# Patient Record
Sex: MALE | Race: WHITE | Employment: FULL TIME | URBAN - METROPOLITAN AREA
[De-identification: names, ages, dates, MRNs, and addresses within clinical notes are randomized per-mention and may not be internally consistent; named-entity substitution may affect disease eponyms.]

---

## 2023-03-19 ENCOUNTER — HOSPITAL ENCOUNTER (OUTPATIENT)
Age: 43
Discharge: HOME | End: 2023-03-19
Payer: COMMERCIAL

## 2023-03-19 ENCOUNTER — APPOINTMENT (OUTPATIENT)
Dept: GENERAL RADIOLOGY | Age: 43
End: 2023-03-19
Attending: NURSE PRACTITIONER
Payer: COMMERCIAL

## 2023-03-19 VITALS
OXYGEN SATURATION: 99 % | DIASTOLIC BLOOD PRESSURE: 82 MMHG | TEMPERATURE: 98 F | BODY MASS INDEX: 28.04 KG/M2 | SYSTOLIC BLOOD PRESSURE: 124 MMHG | WEIGHT: 185 LBS | HEIGHT: 68 IN | RESPIRATION RATE: 18 BRPM | HEART RATE: 81 BPM

## 2023-03-19 DIAGNOSIS — J02.0 STREP PHARYNGITIS: Primary | ICD-10-CM

## 2023-03-19 LAB — S PYO AG THROAT QL: POSITIVE

## 2023-03-19 RX ORDER — AMOXICILLIN AND CLAVULANATE POTASSIUM 875; 125 MG/1; MG/1
1 TABLET, FILM COATED ORAL 2 TIMES DAILY
Qty: 14 TABLET | Refills: 0 | Status: SHIPPED | OUTPATIENT
Start: 2023-03-19 | End: 2023-03-26

## 2023-03-19 RX ORDER — IBUPROFEN 400 MG/1
400 TABLET ORAL EVERY 6 HOURS PRN
COMMUNITY

## 2023-03-19 NOTE — DISCHARGE INSTRUCTIONS
Rest and drink plenty of fluids. Take Tylenol and/or ibuprofen as needed for pain or fever. Start the antibiotics and make sure to finish the entire prescription. Do not share glasses, cups, or utensils. Make sure to change your toothbrush after 48 hours of antibiotic use. Follow up with your PCP in 1 week. Thank you for choosing [facility] for your care.

## 2023-03-19 NOTE — ED INITIAL ASSESSMENT (HPI)
Patient's family has been sick recently. He started 8 days ago with symptoms of sore throat, chills mid week without confirmed temp, cough that is now productive. He has MS and a history of pneumonia. He is concerned about having pneumonia.

## 2023-04-28 ENCOUNTER — HOSPITAL ENCOUNTER (OUTPATIENT)
Age: 43
Discharge: HOME | End: 2023-04-28
Payer: COMMERCIAL

## 2023-04-28 VITALS
SYSTOLIC BLOOD PRESSURE: 114 MMHG | RESPIRATION RATE: 20 BRPM | HEART RATE: 76 BPM | OXYGEN SATURATION: 97 % | DIASTOLIC BLOOD PRESSURE: 72 MMHG | TEMPERATURE: 97 F

## 2023-04-28 DIAGNOSIS — B34.9 VIRAL SYNDROME: ICD-10-CM

## 2023-04-28 DIAGNOSIS — R05.9 COUGH: Primary | ICD-10-CM

## 2023-04-28 LAB
S PYO AG THROAT QL: NEGATIVE
SARS-COV-2 RNA RESP QL NAA+PROBE: NOT DETECTED

## 2023-04-28 PROCEDURE — 99213 OFFICE O/P EST LOW 20 MIN: CPT | Performed by: NURSE PRACTITIONER

## 2023-04-28 PROCEDURE — U0002 COVID-19 LAB TEST NON-CDC: HCPCS | Performed by: NURSE PRACTITIONER

## 2023-04-28 PROCEDURE — 87880 STREP A ASSAY W/OPTIC: CPT | Performed by: NURSE PRACTITIONER

## 2023-04-28 RX ORDER — FINASTERIDE 1 MG/1
1 TABLET, FILM COATED ORAL DAILY
COMMUNITY
Start: 2022-09-16

## 2023-04-28 RX ORDER — GLATIRAMER 40 MG/ML
40 INJECTION, SOLUTION SUBCUTANEOUS
COMMUNITY
Start: 2023-04-19 | End: 2023-05-19

## 2023-04-28 NOTE — DISCHARGE INSTRUCTIONS
Follow-up with your primary care physician in one week if symptoms have not improved or symptoms are starting to get worse. Increase fluids, keep well-hydrated. Take Tylenol and Motrin for fever and pain. Eat and drink anything that is soothing to the back of the throat. Gargle with warm salt water, throat lozenges will be helpful.     Over-the-counter antihistamine such as Zyrtec, Claritin, Allegra, Xyzal can be helpful choose 1 take it nightly  Over-the-counter Flonase also helpful

## 2023-05-08 ENCOUNTER — HOSPITAL ENCOUNTER (OUTPATIENT)
Age: 43
Discharge: HOME | End: 2023-05-08
Payer: COMMERCIAL

## 2023-05-08 VITALS
RESPIRATION RATE: 18 BRPM | WEIGHT: 185 LBS | HEIGHT: 68 IN | OXYGEN SATURATION: 98 % | SYSTOLIC BLOOD PRESSURE: 127 MMHG | BODY MASS INDEX: 28.04 KG/M2 | DIASTOLIC BLOOD PRESSURE: 78 MMHG | HEART RATE: 78 BPM | TEMPERATURE: 97 F

## 2023-05-08 DIAGNOSIS — J01.00 ACUTE NON-RECURRENT MAXILLARY SINUSITIS: Primary | ICD-10-CM

## 2023-05-08 PROCEDURE — 99213 OFFICE O/P EST LOW 20 MIN: CPT | Performed by: NURSE PRACTITIONER

## 2023-05-08 RX ORDER — AMOXICILLIN AND CLAVULANATE POTASSIUM 875; 125 MG/1; MG/1
1 TABLET, FILM COATED ORAL 2 TIMES DAILY
Qty: 20 TABLET | Refills: 0 | Status: SHIPPED | OUTPATIENT
Start: 2023-05-08 | End: 2023-05-18

## 2023-05-08 NOTE — ED INITIAL ASSESSMENT (HPI)
Pt was seen 4/28-URI. Pt c/o \"coughing up green mucus along with blowing out my nose. I feel pressure on my face with body aches, occasional headaches with fatigue. \"

## 2023-07-18 ENCOUNTER — HOSPITAL ENCOUNTER (OUTPATIENT)
Age: 43
Discharge: HOME | End: 2023-07-18
Payer: COMMERCIAL

## 2023-07-18 VITALS
DIASTOLIC BLOOD PRESSURE: 69 MMHG | OXYGEN SATURATION: 98 % | SYSTOLIC BLOOD PRESSURE: 118 MMHG | HEART RATE: 69 BPM | RESPIRATION RATE: 18 BRPM | TEMPERATURE: 98 F

## 2023-07-18 DIAGNOSIS — K40.90 NON-RECURRENT UNILATERAL INGUINAL HERNIA WITHOUT OBSTRUCTION OR GANGRENE: Primary | ICD-10-CM

## 2023-07-18 PROBLEM — Q21.12 PFO (PATENT FORAMEN OVALE): Status: ACTIVE | Noted: 2023-07-18

## 2023-07-18 PROBLEM — I63.9 CEREBROVASCULAR ACCIDENT (CVA) (CMD): Status: ACTIVE | Noted: 2023-07-18

## 2023-07-18 PROCEDURE — 99203 OFFICE O/P NEW LOW 30 MIN: CPT | Performed by: PHYSICIAN ASSISTANT

## 2023-07-18 NOTE — DISCHARGE INSTRUCTIONS
For any signs of acute incarceration including discoloration, severe pain, firmness report to the ER. This is a surgical emergency.   Otherwise, outpatient follow-up

## 2023-07-20 ENCOUNTER — OFFICE VISIT (OUTPATIENT)
Dept: PEDIATRIC CARDIOLOGY | Age: 43
End: 2023-07-20

## 2023-07-20 ENCOUNTER — APPOINTMENT (OUTPATIENT)
Dept: PEDIATRIC CARDIOLOGY | Age: 43
End: 2023-07-20

## 2023-07-20 VITALS
SYSTOLIC BLOOD PRESSURE: 122 MMHG | HEART RATE: 70 BPM | DIASTOLIC BLOOD PRESSURE: 75 MMHG | OXYGEN SATURATION: 98 % | WEIGHT: 178.24 LBS | HEIGHT: 67 IN | BODY MASS INDEX: 27.98 KG/M2

## 2023-07-20 DIAGNOSIS — I63.9 CEREBROVASCULAR ACCIDENT (CVA), UNSPECIFIED MECHANISM (CMD): ICD-10-CM

## 2023-07-20 DIAGNOSIS — Q21.12 PFO (PATENT FORAMEN OVALE): Primary | ICD-10-CM

## 2023-07-20 PROCEDURE — 99205 OFFICE O/P NEW HI 60 MIN: CPT | Performed by: PEDIATRICS

## 2023-07-20 RX ORDER — LORATADINE 10 MG/1
10 CAPSULE, LIQUID FILLED ORAL DAILY
COMMUNITY
Start: 2023-05-15

## 2023-07-20 RX ORDER — OMEGA-3 FATTY ACIDS/FISH OIL 300-1000MG
400 CAPSULE ORAL DAILY
COMMUNITY
Start: 2023-04-26

## 2023-07-20 RX ORDER — GLATIRAMER 40 MG/ML
40 INJECTION, SOLUTION SUBCUTANEOUS
COMMUNITY
Start: 2023-06-13

## 2023-07-20 RX ORDER — ASPIRIN 325 MG
325 TABLET, DELAYED RELEASE (ENTERIC COATED) ORAL
COMMUNITY

## 2023-07-20 RX ORDER — FINASTERIDE 1 MG/1
1 TABLET, FILM COATED ORAL DAILY
COMMUNITY
Start: 2023-05-30

## 2023-07-20 ASSESSMENT — PAIN SCALES - GENERAL: PAINLEVEL: 0

## 2023-08-28 ENCOUNTER — OFFICE VISIT (OUTPATIENT)
Facility: LOCATION | Age: 43
End: 2023-08-28
Payer: COMMERCIAL

## 2023-08-28 DIAGNOSIS — K40.90 RIGHT INGUINAL HERNIA: Primary | ICD-10-CM

## 2023-08-28 DIAGNOSIS — Q21.12 PATENT FORAMEN OVALE: ICD-10-CM

## 2023-08-28 NOTE — H&P (VIEW-ONLY)
New Patient Visit Note       Active Problems      1. Right inguinal hernia    2. Patent foramen ovale        Chief Complaint   Patient presents with:  New Patient: NP - INGUINAL HERNIA REPAIR CONSULT, HERNIA POPPED 7/16, IMMEDIATE CARE 7/18, NO OTHER SYMPTOMS. History of Present Illness   The patient is a 45-year-old gentleman who presents for evaluation of the right inguinal hernia. He is seen at the request of her primary care physician. The patient states on July 18th he was seen at an urgent care after noticing a bulge in the right groin. He states he first noticed the bulge on July 16th while at [facility]. He states it initially felt hard and wouldn't reduce, but the following day it reduced spontaneously. The hernia increases in size with standing and Valsalva. He occasionally has discomfort in the area after lifting or with activity. The patient does have some constipation, but is unsure whether it is related. He denies difficulty urinating. The patient states he has a PFO and his cardiologist wants the hernia fixed prior to the repair of the PFO. He previously saw Dr. Malathi Handy at [facility]. He is now seeing someone at [employer], Dr. Myra Uriostegui. He takes 325 mg of aspirin daily. He also has a history of an ischemic vertebrobasilar artery thalamic stroke in 2014. The patient has no significant past surgical history. I acted as a scribe in this encounter. The physician obtained a history, independently performed a physical exam, and developed an assessment and plan. The physician performed all medical decision making. Indira Kent PA-C        Allergies  Daphne Castillo has No Known Allergies. Past Medical / Surgical / Social / Family History    The past medical and past surgical history have been reviewed by me today.     Past Medical History:   Diagnosis Date    MS (multiple sclerosis) ([facility] [address]     PFO (patent foramen ovale)     Stroke [facility])      Past Surgical History:   Procedure Laterality Date    OTHER SURGICAL HISTORY  2001    nasal passage surgery    URINE FLOW MEASUREMENT  12/09    normal       The family history and social history have been reviewed by me today. Family History   Problem Relation Age of Onset    Diabetes Maternal Grandfather     Neurological Disorder Maternal Grandmother         dementia    Neurological Disorder Brother         tic     Social History    Socioeconomic History      Marital status:     Occupational History      Occupation:     Tobacco Use      Smoking status: Never      Smokeless tobacco: Never    Substance and Sexual Activity      Alcohol use: Yes        Alcohol/week: 0.0 standard drinks of alcohol        Comment: 3-5 beer/liquor      Drug use: No    Other Topics      Concerns:         Service: No        Blood Transfusions: No        Caffeine Concern: Yes          3 cups of coffee daily; energy drinks 2 cans week        Occupational Exposure: No        Hobby Hazards: No        Sleep Concern: No        Stress Concern: No        Weight Concern: No        Special Diet: No        Back Care: Yes        Exercise: Yes          walks dog 20 minute day        Bike Helmet: Yes        Seat Belt: Yes        Self-Exams: Yes       Current Outpatient Medications:     finasteride 1 MG Oral Tab, Take 1 tablet (1 mg total) by mouth daily. (Patient not taking: Reported on 7/18/2023), Disp: , Rfl:     ibuprofen 400 MG Oral Tab, Take 1 tablet (400 mg total) by mouth every 6 (six) hours as needed for Pain. (Patient not taking: Reported on 7/18/2023), Disp: , Rfl:     Multiple Vitamin Oral Tab, Take 1 tablet by mouth daily. occasional, Disp: , Rfl:     aspirin  MG Oral Tab EC, Take 1 tablet (325 mg total) by mouth daily. (Patient not taking: Reported on 7/18/2023), Disp: , Rfl:       Review of Systems  The Review of Systems has been reviewed by me during today.   Review of Systems   Constitutional: Negative. HENT: Negative. Eyes: Negative. Respiratory: Negative. Cardiovascular: Negative. Gastrointestinal: Negative. Genitourinary: Negative. Musculoskeletal: Negative. Skin: Negative. Neurological: Negative. Psychiatric/Behavioral: Negative. Physical Exam  Constitutional:       Appearance: Normal appearance. He is normal weight. HENT:      Head: Normocephalic and atraumatic. Right Ear: External ear normal.      Left Ear: External ear normal.      Nose: Nose normal.   Eyes:      Conjunctiva/sclera: Conjunctivae normal.   Abdominal:      General: Abdomen is flat. Bowel sounds are normal. There is no distension. Palpations: Abdomen is soft. There is no mass. Tenderness: There is no abdominal tenderness. Hernia: A hernia is present. Hernia is present in the right inguinal area. There is no hernia in the umbilical area, ventral area, left inguinal area, right femoral area or left femoral area. Comments: Clinical exam of the abdomen is soft, nontender, nondistended. No rebound or guarding. Liver and spleen are nonpalpable. There is no umbilical hernia. + Right inguinal hernia    Neurological:      Mental Status: He is alert. Psychiatric:         Mood and Affect: Mood normal.         Behavior: Behavior normal.             Assessment   Right inguinal hernia  (primary encounter diagnosis)  Patent foramen ovale      Plan   The patient was offered laparoscopic right inguinal hernia repair with mesh. The risks, benefits, and alternatives of this were discussed in detail with the patient. Risks included, but were not limited to, recurrence of the hernia, bleeding, wound infection, and injury to the hernia contents. The patient was agreeable to proceed with the operation. He will need cardiac clearance prior to surgery. His surgery has been scheduled for September 18, 2023.      Astrid Mari MD    Addendum:  I, Dr. Maryanne Mohs, personally performed the services described in this documentation, as scribed by Carlos Galvez PA-C, in my presence, and it is both accurate and complete.

## 2023-08-28 NOTE — H&P
New Patient Visit Note       Active Problems      1. Right inguinal hernia    2. Patent foramen ovale        Chief Complaint   Patient presents with:  New Patient: NP - INGUINAL HERNIA REPAIR CONSULT, HERNIA POPPED 7/16, IMMEDIATE CARE 7/18, NO OTHER SYMPTOMS. History of Present Illness   The patient is a 49-year-old gentleman who presents for evaluation of the right inguinal hernia. He is seen at the request of her primary care physician. The patient states on July 18th he was seen at an urgent care after noticing a bulge in the right groin. He states he first noticed the bulge on July 16th while at [facility]. He states it initially felt hard and wouldn't reduce, but the following day it reduced spontaneously. The hernia increases in size with standing and Valsalva. He occasionally has discomfort in the area after lifting or with activity. The patient does have some constipation, but is unsure whether it is related. He denies difficulty urinating. The patient states he has a PFO and his cardiologist wants the hernia fixed prior to the repair of the PFO. He previously saw Dr. Cecilia Eric at [facility]. He is [facility], Dr. Verónica Frye. He takes 325 mg of aspirin daily. He also has a history of an ischemic vertebrobasilar artery thalamic stroke in 2014. The patient has no significant past surgical history. I acted as a scribe in this encounter. The physician obtained a history, independently performed a physical exam, and developed an assessment and plan. The physician performed all medical decision making. ELISABET Norwood has No Known Allergies. Past Medical / Surgical / Social / Family History    The past medical and past surgical history have been reviewed by me today.     Past Medical History:   Diagnosis Date    MS (multiple sclerosis) ([facility] [address]     PFO (patent foramen ovale)     Stroke [facility])      Past Surgical History:   Procedure Laterality Date    OTHER SURGICAL HISTORY  2001    nasal passage surgery    URINE FLOW MEASUREMENT  12/09    normal       The family history and social history have been reviewed by me today. Family History   Problem Relation Age of Onset    Diabetes Maternal Grandfather     Neurological Disorder Maternal Grandmother         dementia    Neurological Disorder Brother         tic     Social History    Socioeconomic History      Marital status:     Occupational History      Occupation:     Tobacco Use      Smoking status: Never      Smokeless tobacco: Never    Substance and Sexual Activity      Alcohol use: Yes        Alcohol/week: 0.0 standard drinks of alcohol        Comment: 3-5 beer/liquor      Drug use: No    Other Topics      Concerns:         Service: No        Blood Transfusions: No        Caffeine Concern: Yes          3 cups of coffee daily; energy drinks 2 cans week        Occupational Exposure: No        Hobby Hazards: No        Sleep Concern: No        Stress Concern: No        Weight Concern: No        Special Diet: No        Back Care: Yes        Exercise: Yes          walks dog 20 minute day        Bike Helmet: Yes        Seat Belt: Yes        Self-Exams: Yes       Current Outpatient Medications:     finasteride 1 MG Oral Tab, Take 1 tablet (1 mg total) by mouth daily. (Patient not taking: Reported on 7/18/2023), Disp: , Rfl:     ibuprofen 400 MG Oral Tab, Take 1 tablet (400 mg total) by mouth every 6 (six) hours as needed for Pain. (Patient not taking: Reported on 7/18/2023), Disp: , Rfl:     Multiple Vitamin Oral Tab, Take 1 tablet by mouth daily. occasional, Disp: , Rfl:     aspirin  MG Oral Tab EC, Take 1 tablet (325 mg total) by mouth daily. (Patient not taking: Reported on 7/18/2023), Disp: , Rfl:       Review of Systems  The Review of Systems has been reviewed by me during today.   Review of Systems   Constitutional: Negative. HENT: Negative. Eyes: Negative. Respiratory: Negative. Cardiovascular: Negative. Gastrointestinal: Negative. Genitourinary: Negative. Musculoskeletal: Negative. Skin: Negative. Neurological: Negative. Psychiatric/Behavioral: Negative. Physical Exam  Constitutional:       Appearance: Normal appearance. He is normal weight. HENT:      Head: Normocephalic and atraumatic. Right Ear: External ear normal.      Left Ear: External ear normal.      Nose: Nose normal.   Eyes:      Conjunctiva/sclera: Conjunctivae normal.   Abdominal:      General: Abdomen is flat. Bowel sounds are normal. There is no distension. Palpations: Abdomen is soft. There is no mass. Tenderness: There is no abdominal tenderness. Hernia: A hernia is present. Hernia is present in the right inguinal area. There is no hernia in the umbilical area, ventral area, left inguinal area, right femoral area or left femoral area. Comments: Clinical exam of the abdomen is soft, nontender, nondistended. No rebound or guarding. Liver and spleen are nonpalpable. There is no umbilical hernia. + Right inguinal hernia    Neurological:      Mental Status: He is alert. Psychiatric:         Mood and Affect: Mood normal.         Behavior: Behavior normal.             Assessment   Right inguinal hernia  (primary encounter diagnosis)  Patent foramen ovale      Plan   The patient was offered laparoscopic right inguinal hernia repair with mesh. The risks, benefits, and alternatives of this were discussed in detail with the patient. Risks included, but were not limited to, recurrence of the hernia, bleeding, wound infection, and injury to the hernia contents. The patient was agreeable to proceed with the operation. He will need cardiac clearance prior to surgery. His surgery has been scheduled for September 18, 2023.      Tone Garcia MD    Addendum:  I, Dr. Jayesh Santo, personally performed the services described in this documentation, as scribed by Carlos Galvez PA-C, in my presence, and it is both accurate and complete.

## 2023-08-29 ENCOUNTER — TELEPHONE (OUTPATIENT)
Facility: LOCATION | Age: 43
End: 2023-08-29

## 2023-08-29 DIAGNOSIS — K40.90 RIGHT INGUINAL HERNIA: Primary | ICD-10-CM

## 2023-08-31 ENCOUNTER — TELEPHONE (OUTPATIENT)
Facility: LOCATION | Age: 43
End: 2023-08-31

## 2023-08-31 ENCOUNTER — TELEPHONE (OUTPATIENT)
Dept: PEDIATRIC CARDIOLOGY | Age: 43
End: 2023-08-31

## 2023-08-31 RX ORDER — GLATIRAMER 40 MG/ML
40 INJECTION, SOLUTION SUBCUTANEOUS WEEKLY
COMMUNITY

## 2023-08-31 RX ORDER — LORATADINE 10 MG/1
10 TABLET, ORALLY DISINTEGRATING ORAL DAILY
COMMUNITY

## 2023-09-13 ENCOUNTER — TELEPHONE (OUTPATIENT)
Facility: LOCATION | Age: 43
End: 2023-09-13

## 2023-09-17 ENCOUNTER — ANESTHESIA EVENT (OUTPATIENT)
Dept: SURGERY | Facility: HOSPITAL | Age: 43
End: 2023-09-17
Payer: COMMERCIAL

## 2023-09-18 ENCOUNTER — HOSPITAL ENCOUNTER (OUTPATIENT)
Facility: HOSPITAL | Age: 43
Setting detail: HOSPITAL OUTPATIENT SURGERY
Discharge: HOME | End: 2023-09-18
Attending: SURGERY | Admitting: SURGERY
Payer: COMMERCIAL

## 2023-09-18 ENCOUNTER — ANESTHESIA (OUTPATIENT)
Dept: SURGERY | Facility: HOSPITAL | Age: 43
End: 2023-09-18
Payer: COMMERCIAL

## 2023-09-18 VITALS
SYSTOLIC BLOOD PRESSURE: 109 MMHG | DIASTOLIC BLOOD PRESSURE: 80 MMHG | BODY MASS INDEX: 28.25 KG/M2 | HEART RATE: 77 BPM | RESPIRATION RATE: 18 BRPM | OXYGEN SATURATION: 96 % | WEIGHT: 180 LBS | HEIGHT: 67 IN | TEMPERATURE: 98 F

## 2023-09-18 DIAGNOSIS — I63.112 CEREBROVASCULAR ACCIDENT (CVA) DUE TO EMBOLISM OF LEFT VERTEBRAL ARTERY (HCC): ICD-10-CM

## 2023-09-18 DIAGNOSIS — Q21.12 PATENT FORAMEN OVALE: ICD-10-CM

## 2023-09-18 PROCEDURE — 0YUA4JZ SUPPLEMENT BILATERAL INGUINAL REGION WITH SYNTHETIC SUBSTITUTE, PERCUTANEOUS ENDOSCOPIC APPROACH: ICD-10-PCS | Performed by: SURGERY

## 2023-09-18 DEVICE — BARD MESH
Type: IMPLANTABLE DEVICE | Site: INGUINAL | Status: FUNCTIONAL
Brand: BARD MESH

## 2023-09-18 RX ORDER — KETOROLAC TROMETHAMINE 30 MG/ML
INJECTION, SOLUTION INTRAMUSCULAR; INTRAVENOUS AS NEEDED
Status: DISCONTINUED | OUTPATIENT
Start: 2023-09-18 | End: 2023-09-18 | Stop reason: SURG

## 2023-09-18 RX ORDER — SODIUM CHLORIDE, SODIUM LACTATE, POTASSIUM CHLORIDE, CALCIUM CHLORIDE 600; 310; 30; 20 MG/100ML; MG/100ML; MG/100ML; MG/100ML
INJECTION, SOLUTION INTRAVENOUS CONTINUOUS
Status: DISCONTINUED | OUTPATIENT
Start: 2023-09-18 | End: 2023-09-18

## 2023-09-18 RX ORDER — ONDANSETRON 2 MG/ML
INJECTION INTRAMUSCULAR; INTRAVENOUS AS NEEDED
Status: DISCONTINUED | OUTPATIENT
Start: 2023-09-18 | End: 2023-09-18 | Stop reason: SURG

## 2023-09-18 RX ORDER — ACETAMINOPHEN 500 MG
1000 TABLET ORAL ONCE AS NEEDED
Status: COMPLETED | OUTPATIENT
Start: 2023-09-18 | End: 2023-09-18

## 2023-09-18 RX ORDER — HYDROCODONE BITARTRATE AND ACETAMINOPHEN 10; 325 MG/1; MG/1
2 TABLET ORAL ONCE AS NEEDED
Status: COMPLETED | OUTPATIENT
Start: 2023-09-18 | End: 2023-09-18

## 2023-09-18 RX ORDER — BUPIVACAINE HYDROCHLORIDE AND EPINEPHRINE 5; 5 MG/ML; UG/ML
INJECTION, SOLUTION EPIDURAL; INTRACAUDAL; PERINEURAL AS NEEDED
Status: DISCONTINUED | OUTPATIENT
Start: 2023-09-18 | End: 2023-09-18 | Stop reason: HOSPADM

## 2023-09-18 RX ORDER — DEXAMETHASONE SODIUM PHOSPHATE 4 MG/ML
VIAL (ML) INJECTION AS NEEDED
Status: DISCONTINUED | OUTPATIENT
Start: 2023-09-18 | End: 2023-09-18 | Stop reason: SURG

## 2023-09-18 RX ORDER — LIDOCAINE HYDROCHLORIDE 10 MG/ML
INJECTION, SOLUTION EPIDURAL; INFILTRATION; INTRACAUDAL; PERINEURAL AS NEEDED
Status: DISCONTINUED | OUTPATIENT
Start: 2023-09-18 | End: 2023-09-18 | Stop reason: SURG

## 2023-09-18 RX ORDER — NALOXONE HYDROCHLORIDE 0.4 MG/ML
80 INJECTION, SOLUTION INTRAMUSCULAR; INTRAVENOUS; SUBCUTANEOUS AS NEEDED
Status: DISCONTINUED | OUTPATIENT
Start: 2023-09-18 | End: 2023-09-18

## 2023-09-18 RX ORDER — ONDANSETRON 2 MG/ML
4 INJECTION INTRAMUSCULAR; INTRAVENOUS EVERY 6 HOURS PRN
Status: DISCONTINUED | OUTPATIENT
Start: 2023-09-18 | End: 2023-09-18

## 2023-09-18 RX ORDER — HYDROCODONE BITARTRATE AND ACETAMINOPHEN 5; 325 MG/1; MG/1
1 TABLET ORAL EVERY 6 HOURS PRN
Qty: 10 TABLET | Refills: 0 | Status: SHIPPED | OUTPATIENT
Start: 2023-09-18

## 2023-09-18 RX ORDER — ACETAMINOPHEN 500 MG
1000 TABLET ORAL ONCE
Status: DISCONTINUED | OUTPATIENT
Start: 2023-09-18 | End: 2023-09-18 | Stop reason: HOSPADM

## 2023-09-18 RX ORDER — ROCURONIUM BROMIDE 10 MG/ML
INJECTION, SOLUTION INTRAVENOUS AS NEEDED
Status: DISCONTINUED | OUTPATIENT
Start: 2023-09-18 | End: 2023-09-18 | Stop reason: SURG

## 2023-09-18 RX ORDER — CEFAZOLIN SODIUM/WATER 2 G/20 ML
2 SYRINGE (ML) INTRAVENOUS ONCE
Status: COMPLETED | OUTPATIENT
Start: 2023-09-18 | End: 2023-09-18

## 2023-09-18 RX ORDER — NEOSTIGMINE METHYLSULFATE 1 MG/ML
INJECTION, SOLUTION INTRAVENOUS AS NEEDED
Status: DISCONTINUED | OUTPATIENT
Start: 2023-09-18 | End: 2023-09-18 | Stop reason: SURG

## 2023-09-18 RX ORDER — LABETALOL HYDROCHLORIDE 5 MG/ML
5 INJECTION, SOLUTION INTRAVENOUS EVERY 5 MIN PRN
Status: DISCONTINUED | OUTPATIENT
Start: 2023-09-18 | End: 2023-09-18

## 2023-09-18 RX ORDER — HYDROCODONE BITARTRATE AND ACETAMINOPHEN 10; 325 MG/1; MG/1
1 TABLET ORAL ONCE AS NEEDED
Status: COMPLETED | OUTPATIENT
Start: 2023-09-18 | End: 2023-09-18

## 2023-09-18 RX ORDER — MEPERIDINE HYDROCHLORIDE 25 MG/ML
12.5 INJECTION INTRAMUSCULAR; INTRAVENOUS; SUBCUTANEOUS AS NEEDED
Status: DISCONTINUED | OUTPATIENT
Start: 2023-09-18 | End: 2023-09-18

## 2023-09-18 RX ORDER — SCOLOPAMINE TRANSDERMAL SYSTEM 1 MG/1
1 PATCH, EXTENDED RELEASE TRANSDERMAL ONCE
Status: DISCONTINUED | OUTPATIENT
Start: 2023-09-18 | End: 2023-09-18 | Stop reason: HOSPADM

## 2023-09-18 RX ORDER — HYDROMORPHONE HYDROCHLORIDE 1 MG/ML
0.4 INJECTION, SOLUTION INTRAMUSCULAR; INTRAVENOUS; SUBCUTANEOUS EVERY 5 MIN PRN
Status: DISCONTINUED | OUTPATIENT
Start: 2023-09-18 | End: 2023-09-18

## 2023-09-18 RX ORDER — HYDROMORPHONE HYDROCHLORIDE 1 MG/ML
0.2 INJECTION, SOLUTION INTRAMUSCULAR; INTRAVENOUS; SUBCUTANEOUS EVERY 5 MIN PRN
Status: DISCONTINUED | OUTPATIENT
Start: 2023-09-18 | End: 2023-09-18

## 2023-09-18 RX ORDER — PROCHLORPERAZINE EDISYLATE 5 MG/ML
5 INJECTION INTRAMUSCULAR; INTRAVENOUS EVERY 8 HOURS PRN
Status: DISCONTINUED | OUTPATIENT
Start: 2023-09-18 | End: 2023-09-18

## 2023-09-18 RX ORDER — GLYCOPYRROLATE 0.2 MG/ML
INJECTION, SOLUTION INTRAMUSCULAR; INTRAVENOUS AS NEEDED
Status: DISCONTINUED | OUTPATIENT
Start: 2023-09-18 | End: 2023-09-18 | Stop reason: SURG

## 2023-09-18 RX ORDER — HYDROMORPHONE HYDROCHLORIDE 1 MG/ML
0.6 INJECTION, SOLUTION INTRAMUSCULAR; INTRAVENOUS; SUBCUTANEOUS EVERY 5 MIN PRN
Status: DISCONTINUED | OUTPATIENT
Start: 2023-09-18 | End: 2023-09-18

## 2023-09-18 RX ORDER — HEPARIN SODIUM 5000 [USP'U]/ML
5000 INJECTION, SOLUTION INTRAVENOUS; SUBCUTANEOUS ONCE
Status: COMPLETED | OUTPATIENT
Start: 2023-09-18 | End: 2023-09-18

## 2023-09-18 RX ORDER — MIDAZOLAM HYDROCHLORIDE 1 MG/ML
1 INJECTION INTRAMUSCULAR; INTRAVENOUS EVERY 5 MIN PRN
Status: DISCONTINUED | OUTPATIENT
Start: 2023-09-18 | End: 2023-09-18

## 2023-09-18 RX ORDER — ASPIRIN 325 MG
325 TABLET, DELAYED RELEASE (ENTERIC COATED) ORAL DAILY
Status: SHIPPED | COMMUNITY
Start: 2023-09-20

## 2023-09-18 RX ADMIN — LIDOCAINE HYDROCHLORIDE 50 MG: 10 INJECTION, SOLUTION EPIDURAL; INFILTRATION; INTRACAUDAL; PERINEURAL at 09:12:00

## 2023-09-18 RX ADMIN — KETOROLAC TROMETHAMINE 30 MG: 30 INJECTION, SOLUTION INTRAMUSCULAR; INTRAVENOUS at 10:26:00

## 2023-09-18 RX ADMIN — NEOSTIGMINE METHYLSULFATE 4 MG: 1 INJECTION, SOLUTION INTRAVENOUS at 10:29:00

## 2023-09-18 RX ADMIN — ONDANSETRON 4 MG: 2 INJECTION INTRAMUSCULAR; INTRAVENOUS at 10:26:00

## 2023-09-18 RX ADMIN — GLYCOPYRROLATE 0.4 MG: 0.2 INJECTION, SOLUTION INTRAMUSCULAR; INTRAVENOUS at 10:29:00

## 2023-09-18 RX ADMIN — ROCURONIUM BROMIDE 40 MG: 10 INJECTION, SOLUTION INTRAVENOUS at 09:12:00

## 2023-09-18 RX ADMIN — ROCURONIUM BROMIDE 10 MG: 10 INJECTION, SOLUTION INTRAVENOUS at 10:05:00

## 2023-09-18 RX ADMIN — DEXAMETHASONE SODIUM PHOSPHATE 8 MG: 4 MG/ML VIAL (ML) INJECTION at 09:12:00

## 2023-09-18 RX ADMIN — CEFAZOLIN SODIUM/WATER 2 G: 2 G/20 ML SYRINGE (ML) INTRAVENOUS at 09:11:00

## 2023-09-18 NOTE — INTERVAL H&P NOTE
Pre-op Diagnosis: Right inguinal hernia [K40.90]    The above referenced H&P was reviewed by Francois Bah MD on 9/18/2023, the patient was examined and no significant changes have occurred in the patient's condition since the H&P was performed. I discussed with the patient and/or legal representative the potential benefits, risks and side effects of this procedure; the likelihood of the patient achieving goals; and potential problems that might occur during recuperation. I discussed reasonable alternatives to the procedure, including risks, benefits and side effects related to the alternatives and risks related to not receiving this procedure. We will proceed with procedure as planned.

## 2023-09-18 NOTE — OPERATIVE REPORT
[facility]  Op Note    [facility] Location: OR   [facility] [id number] MRN [id number]   Admission Date 9/18/2023 Operation Date 9/18/2023   Attending Physician Scout Clark MD Operating Physician Pilar Sanchez MD   DATE OF OPERATION:  9/18/2023  PREOPERATIVE DIAGNOSIS:  Right inguinal hernia. POSTOPERATIVE DIAGNOSIS:  Bilateral inguinal hernia. PROCEDURE PERFORMED: Laparoscopic bilateral inguinal hernia repair with mesh (Marlex mesh used)  SURGEON:  Kim Amor M.D.  ASSISTANT: Wu Lafleur PA-C  ANESTHESIA: General.   SPECIMEN: None. BLOOD LOSS: 10 mL. COMPLICATIONS: None. INDICATIONS FOR PROCEDURE: The patient is a 51-year-old gentleman who noticed a bulge in his right groin. Physical exam confirmed a right inguinal hernia. The patient was offered laparoscopic inguinal hernia repair with mesh. The risks, benefits, and alternatives of this were discussed in detail with the patient. Risks included, but were not limited to, recurrence of the hernia, bleeding, wound infection, and injury to the cord vessels and spermatic cord itself. The patient was agreeable to proceed with the operation. OPERATIVE TECHNIQUE:  After informed consent was obtained, patient was taken to the operating room and placed in supine position. General anesthesia was induced, and a Ricardo catheter was placed. The abdomen was then prepped and draped in the usual sterile fashion. A curvilinear incision was made superior to the umbilicus with an 11 blade scalpel. The Veress needle was passed into the abdomen, and pneumoperitoneum was instituted to a pressure of 15 without difficulty. The 11 mm trocar was passed to this incision site. The abdomen was inspected and found to be grossly normal except for the visualized right inguinal hernia. A left inguinal hernia was also seen. It was decided to proceed with repair of both hernias.   Under direct vision, bilateral mid abdominal 5 mm ports were placed. The patient was placed in Trendelenburg position. Attention was turned to the right inguinal region where the hernia defect was identified. The peritoneum was grasped, retracted and divided. Once the peritoneum was incised, blunt and sharp dissection was used with judicious use of electrocautery to achieve hemostasis. Medial to lateral dissection was accomplished identifying initially the pubic tubercle and Ton's ligament, and the soft tissues were dissected laterally to create a sufficient pocket to accommodate mesh. Next, the hernia sac was grasped and retracted. Blunt and sharp dissection technique was utilized to reduce the hernia sac into the abdominal cavity. Once this was accomplished, a Marlex mesh was passed on the field. It was trimmed to size and passed down the umbilical port site. It was placed in the inguinal region and secured with the tacker. Attention was turned to the left inguinal region where the hernia defect was identified. In the same fashion, the peritoneum was grasped, retracted and divided. Once the peritoneum was incised, blunt and sharp dissection was used with judicious use of electrocautery to achieve hemostasis. Medial to lateral dissection was accomplished identifying initially the pubic tubercle and Ton's ligament, and the soft tissues were dissected laterally to create a sufficient pocket to accommodate mesh. Next, the hernia sac was grasped and retracted. Blunt and sharp dissection technique was utilized to reduce the hernia sac into the abdominal cavity. Once this was accomplished, a Marlex mesh was passed on the field. It was trimmed to size and passed down the umbilical port site. It was placed in the inguinal region and secured with the tacker. Once the mesh was in place, the peritoneal flap on both sides was then reapproximated using the tacker. The pneumoperitoneum was released, and all instruments had been removed under direct vision as well.   The fascia at the umbilical port site was closed with an 0 Maxon suture. The skin at all incision sites was closed with a 4-0 Vicryl suture. Marcaine 0.5% with epinephrine was injected into the incisions to help with postoperative pain control. Steri-Strips and Mastisol were placed across the incisions as well as sterile dressings. The patient tolerated the procedure well, was extubated in the OR, and went to the PACU in good condition.   Umm Ochoa MD

## 2023-09-29 ENCOUNTER — OFFICE VISIT (OUTPATIENT)
Facility: LOCATION | Age: 43
End: 2023-09-29

## 2023-09-29 DIAGNOSIS — Z98.890 POST-OPERATIVE STATE: Primary | ICD-10-CM

## 2023-09-29 PROCEDURE — 99024 POSTOP FOLLOW-UP VISIT: CPT | Performed by: PHYSICIAN ASSISTANT

## 2023-09-29 NOTE — PROGRESS NOTES
Post Operative Visit Note       Active Problems  1. Post-operative state         Chief Complaint   Patient presents with:  Post-Op: PO - Laparoscopic Bilateral Inguinal Hernia Repair With Mesh W/ DR. WHITE 9/18, soreness, pain, no other symptoms. History of Present Illness   37year old male who presents today for postoperative visit following laparoscopic bilateral inguinal hernia repair with mesh on 9/18/2023 by Dr. Kimberlyn Borden. The patient states that since his surgery he is doing well. He reports minimal incisional tenderness. He denies nausea or vomiting. He denies diarrhea or constipation. Allergies  Kaleigh Rodriugez has No Known Allergies. Past Medical / Surgical / Social / Family History    The past medical and past surgical history have been reviewed by me today. Past Medical History:   Diagnosis Date    MS (multiple sclerosis) ([facility] [address]     PFO (patent foramen ovale)     Stroke ([facility] [address] 02/2014    Visual impairment     contacts/glasses     Past Surgical History:   Procedure Laterality Date    OTHER SURGICAL HISTORY  2001    nasal passage surgery    URINE FLOW MEASUREMENT  12/09    normal       The family history and social history have been reviewed by me today.     Family History   Problem Relation Age of Onset    Diabetes Maternal Grandfather     Neurological Disorder Maternal Grandmother         dementia    Neurological Disorder Brother         tic     Social History    Socioeconomic History      Marital status:     Occupational History      Occupation:     Tobacco Use      Smoking status: Never      Smokeless tobacco: Never    Vaping Use      Vaping Use: Never used    Substance and Sexual Activity      Alcohol use: Not Currently        Comment: none for the last 10 months      Drug use: No    Other Topics      Concerns:         Service: No        Blood Transfusions: No        Caffeine Concern: Yes          3 cups of coffee daily; energy drinks 2 cans week Occupational Exposure: No        Hobby Hazards: No        Sleep Concern: No        Stress Concern: No        Weight Concern: No        Special Diet: No        Back Care: Yes        Exercise: Yes          walks dog 20 minute day        Bike Helmet: Yes        Seat Belt: Yes        Self-Exams: Yes       Current Outpatient Medications:     aspirin 325 MG Oral Tab EC, Take 1 tablet (325 mg total) by mouth daily. , Disp: , Rfl:     HYDROcodone-acetaminophen (NORCO) 5-325 MG Oral Tab, Take 1 tablet by mouth every 6 (six) hours as needed for Pain., Disp: 10 tablet, Rfl: 0    Glatiramer Acetate (COPAXONE) 40 MG/ML Subcutaneous Solution Prefilled Syringe, Inject 40 mg into the skin once a week. Monday, Wednesday, Friday, Disp: , Rfl:     Loratadine 10 MG Oral Tablet Dispersible, Take 1 tablet (10 mg total) by mouth daily. , Disp: , Rfl:     finasteride 1 MG Oral Tab, Take 1 tablet (1 mg total) by mouth daily. , Disp: , Rfl:     ibuprofen 400 MG Oral Tab, Take 1 tablet (400 mg total) by mouth every 6 (six) hours as needed for Pain., Disp: , Rfl:     Multiple Vitamin Oral Tab, Take 1 tablet by mouth daily. occasional, Disp: , Rfl:       Review of Systems  A 10 point Review of Systems has been completed by me today and is negative except as above in the HPI. Physical Findings   There were no vitals taken for this visit. Gen/psych: alert and oriented, cooperative, no apparent distress  Cardiovascular: regular rate  Respiratory: respirations unlabored, no wheeze  Abdominal: soft, non-tender, non-distended, no guarding/rebound  Incisions:  Visions have healed well. There is no redness or drainage noted. Assessment/Plan  Post-operative state  (primary encounter diagnosis)    The patient is doing well following laparoscopic bilateral inguinal hernia repair with mesh. He is to continue with diet as tolerated.   He is to continue with lifting restrictions of no more than 20 pounds for 6 weeks from the date of his surgery. All questions or concerns were answered. He is to return to the clinic as needed. No orders of the defined types were placed in this encounter. Imaging & Referrals   None    Follow Up  Return if symptoms worsen or fail to improve.     Michael Barksdale PA-C  [facility]  9/29/2023  2:39 PM

## 2023-10-03 ENCOUNTER — OFFICE VISIT (OUTPATIENT)
Facility: LOCATION | Age: 43
End: 2023-10-03

## 2023-10-03 VITALS — TEMPERATURE: 97 F | HEART RATE: 69 BPM

## 2023-10-03 DIAGNOSIS — Z98.890 STATUS POST BILATERAL HERNIA REPAIR: Primary | ICD-10-CM

## 2023-10-03 DIAGNOSIS — Z87.19 STATUS POST BILATERAL HERNIA REPAIR: Primary | ICD-10-CM

## 2023-10-03 PROCEDURE — 99024 POSTOP FOLLOW-UP VISIT: CPT

## 2023-10-04 ENCOUNTER — TELEPHONE (OUTPATIENT)
Dept: CARDIOLOGY | Age: 43
End: 2023-10-04

## 2023-10-04 DIAGNOSIS — Q21.12 PFO (PATENT FORAMEN OVALE): Primary | ICD-10-CM

## 2023-11-15 ENCOUNTER — OFFICE VISIT (OUTPATIENT)
Facility: LOCATION | Age: 43
End: 2023-11-15
Payer: COMMERCIAL

## 2023-11-15 VITALS — HEART RATE: 71 BPM

## 2023-11-15 DIAGNOSIS — T14.8XXA HEMATOMA: ICD-10-CM

## 2023-11-15 DIAGNOSIS — K40.20 NON-RECURRENT BILATERAL INGUINAL HERNIA WITHOUT OBSTRUCTION OR GANGRENE: Primary | ICD-10-CM

## 2023-11-15 PROCEDURE — 99024 POSTOP FOLLOW-UP VISIT: CPT | Performed by: SURGERY

## 2023-11-15 NOTE — PROGRESS NOTES
Post Operative Visit Note       Active Problems  1. Non-recurrent bilateral inguinal hernia without obstruction or gangrene    2. Hematoma         Chief Complaint   Chief Complaint   Patient presents with    Post-Op     PO - Laparoscopic Bilateral Inguinal Hernia Repair With Mesh 9/18, Swelling,  Pain located right side of back area where the ingunal hernia is located, no  other symptoms. - Pt states there is a hematoma in the RLQ  11/7 US done at [facility]           History of Present Illness   The patient is a 51-year-old gentleman who underwent laparoscopic bilateral inguinal herniorrhaphy with mesh on September 18, 2023. The patient noticed some swelling in his right groin. He underwent ultrasound, which indicates a probable hematoma. He presents for follow-up. He states it does not cause discomfort. He is scheduled to undergo a cardiac procedure in the next month. Allergies  Con Nancy has No Known Allergies. Past Medical / Surgical / Social / Family History    The past medical and past surgical history have been reviewed by me today. Past Medical History:   Diagnosis Date    MS (multiple sclerosis) ([facility] [address]     PFO (patent foramen ovale)     Stroke ([facility] [address] 02/2014    Visual impairment     contacts/glasses     Past Surgical History:   Procedure Laterality Date    LAPAROSCOPIC INGUINAL HERNIA REPAIR Bilateral 09/18/2023    OTHER SURGICAL HISTORY  01/01/2001    nasal passage surgery    URINE FLOW MEASUREMENT  12/01/2009    normal       The family history and social history have been reviewed by me today.     Family History   Problem Relation Age of Onset    Diabetes Maternal Grandfather     Neurological Disorder Maternal Grandmother         dementia    Neurological Disorder Brother         tic     Social History     Socioeconomic History    Marital status:    Occupational History    Occupation:    Tobacco Use    Smoking status: Never    Smokeless tobacco: Never   Vaping Use    Vaping Use: Never used   Substance and Sexual Activity    Alcohol use: Not Currently     Comment: none for the last 10 months    Drug use: No   Other Topics Concern     Service No    Blood Transfusions No    Caffeine Concern Yes     Comment: 3 cups of coffee daily; energy drinks 2 cans week    Occupational Exposure No    Hobby Hazards No    Sleep Concern No    Stress Concern No    Weight Concern No    Special Diet No    Back Care Yes    Exercise Yes     Comment: walks dog 20 minute day    Bike Helmet Yes    Seat Belt Yes    Self-Exams Yes        Current Outpatient Medications:     aspirin 325 MG Oral Tab EC, Take 1 tablet (325 mg total) by mouth daily. , Disp: , Rfl:     HYDROcodone-acetaminophen (NORCO) 5-325 MG Oral Tab, Take 1 tablet by mouth every 6 (six) hours as needed for Pain., Disp: 10 tablet, Rfl: 0    Glatiramer Acetate (COPAXONE) 40 MG/ML Subcutaneous Solution Prefilled Syringe, Inject 40 mg into the skin once a week. Monday, Wednesday, Friday, Disp: , Rfl:     Loratadine 10 MG Oral Tablet Dispersible, Take 1 tablet (10 mg total) by mouth daily. , Disp: , Rfl:     finasteride 1 MG Oral Tab, Take 1 tablet (1 mg total) by mouth daily. , Disp: , Rfl:     ibuprofen 400 MG Oral Tab, Take 1 tablet (400 mg total) by mouth every 6 (six) hours as needed for Pain., Disp: , Rfl:     Multiple Vitamin Oral Tab, Take 1 tablet by mouth daily. occasional, Disp: , Rfl:       Review of Systems  The Review of Systems has been reviewed by me during today. Review of Systems   Constitutional:  Negative for chills, diaphoresis, fatigue, fever and unexpected weight change. HENT:  Negative for hearing loss, nosebleeds, sore throat and trouble swallowing. Respiratory:  Negative for apnea, cough, shortness of breath and wheezing. Cardiovascular:  Negative for chest pain, palpitations and leg swelling.    Gastrointestinal:  Negative for abdominal distention, abdominal pain, anal bleeding, blood in stool, constipation, diarrhea, nausea and vomiting. Genitourinary:  Negative for difficulty urinating, dysuria, frequency and urgency. Musculoskeletal:  Negative for arthralgias and myalgias. Skin:  Negative for color change and rash. Neurological:  Negative for tremors, syncope and weakness. Hematological:  Negative for adenopathy. Does not bruise/bleed easily. Psychiatric/Behavioral:  Negative for behavioral problems and sleep disturbance. Physical Findings   Pulse 71   Physical Exam  Constitutional:       Appearance: Normal appearance. He is well-developed. HENT:      Head: Normocephalic and atraumatic. Eyes:      General: No scleral icterus. Conjunctiva/sclera: Conjunctivae normal.   Neck:      Vascular: No JVD. Abdominal:      General: There is no distension or abdominal bruit. Palpations: Abdomen is soft. Abdomen is not rigid. There is no shifting dullness, fluid wave, mass or pulsatile mass. Tenderness: There is no abdominal tenderness. There is no guarding or rebound. Negative signs include Mcbride's sign and McBurney's sign. Hernia: No hernia is present. There is no hernia in the ventral area. Comments: Incisions healing well   Genitourinary:     Comments: 1 cm area of fullness along the right cord structures, consistent with hematoma. There is no tenderness or discoloration  Skin:     General: Skin is warm and dry. Neurological:      Mental Status: He is alert and oriented to person, place, and time. Psychiatric:         Behavior: Behavior normal.         Thought Content: Thought content normal.         Judgment: Judgment normal.             Assessment   1. Non-recurrent bilateral inguinal hernia without obstruction or gangrene    2. Hematoma          Plan   I discussed that frequently patients develop hematomas after surgery. It can take months to resolve. It should not interfere with his cardiac procedure scheduled next month.   I asked him to follow-up with me if he has continued symptoms beyond 6 months.       Kendra Aguirre MD

## 2023-12-12 ENCOUNTER — HOSPITAL ENCOUNTER (OUTPATIENT)
Dept: GENERAL RADIOLOGY | Age: 43
Discharge: HOME | End: 2023-12-12
Attending: PEDIATRICS

## 2023-12-12 ENCOUNTER — LAB SERVICES (OUTPATIENT)
Dept: LAB | Age: 43
End: 2023-12-12
Attending: PEDIATRICS

## 2023-12-12 DIAGNOSIS — Q21.12 PATENT FORAMEN OVALE: Primary | ICD-10-CM

## 2023-12-12 DIAGNOSIS — Q21.12 PFO (PATENT FORAMEN OVALE): ICD-10-CM

## 2023-12-12 LAB
ABO + RH BLD: NORMAL
ALBUMIN SERPL-MCNC: 4.2 G/DL (ref 3.6–5.1)
ALBUMIN/GLOB SERPL: 1.3 {RATIO} (ref 1–2.4)
ALP SERPL-CCNC: 84 UNITS/L (ref 45–117)
ALT SERPL-CCNC: 39 UNITS/L
ANION GAP SERPL CALC-SCNC: 8 MMOL/L (ref 7–19)
AST SERPL-CCNC: 30 UNITS/L
BASOPHILS # BLD: 0 K/MCL (ref 0–0.3)
BASOPHILS NFR BLD: 1 %
BILIRUB SERPL-MCNC: 1.1 MG/DL (ref 0.2–1)
BLD GP AB SCN SERPL QL GEL: NEGATIVE
BUN SERPL-MCNC: 10 MG/DL (ref 6–20)
BUN/CREAT SERPL: 12 (ref 7–25)
CALCIUM SERPL-MCNC: 9.4 MG/DL (ref 8.4–10.2)
CHLORIDE SERPL-SCNC: 106 MMOL/L (ref 97–110)
CO2 SERPL-SCNC: 27 MMOL/L (ref 21–32)
CREAT SERPL-MCNC: 0.83 MG/DL (ref 0.67–1.17)
DEPRECATED RDW RBC: 38.3 FL (ref 39–50)
EGFRCR SERPLBLD CKD-EPI 2021: >90 ML/MIN/{1.73_M2}
EOSINOPHIL # BLD: 0.1 K/MCL (ref 0–0.5)
EOSINOPHIL NFR BLD: 3 %
ERYTHROCYTE [DISTWIDTH] IN BLOOD: 12.6 % (ref 11–15)
FASTING DURATION TIME PATIENT: ABNORMAL H
GLOBULIN SER-MCNC: 3.2 G/DL (ref 2–4)
GLUCOSE SERPL-MCNC: 91 MG/DL (ref 70–99)
HCT VFR BLD CALC: 43 % (ref 39–51)
HGB BLD-MCNC: 15.5 G/DL (ref 13–17)
IMM GRANULOCYTES # BLD AUTO: 0 K/MCL (ref 0–0.2)
IMM GRANULOCYTES # BLD: 0 %
LYMPHOCYTES # BLD: 1.4 K/MCL (ref 1–4.8)
LYMPHOCYTES NFR BLD: 33 %
MCH RBC QN AUTO: 30.5 PG (ref 26–34)
MCHC RBC AUTO-ENTMCNC: 36 G/DL (ref 32–36.5)
MCV RBC AUTO: 84.6 FL (ref 78–100)
MONOCYTES # BLD: 0.3 K/MCL (ref 0.3–0.9)
MONOCYTES NFR BLD: 8 %
NEUTROPHILS # BLD: 2.5 K/MCL (ref 1.8–7.7)
NEUTROPHILS NFR BLD: 55 %
NRBC BLD MANUAL-RTO: 0 /100 WBC
PLATELET # BLD AUTO: 221 K/MCL (ref 140–450)
POTASSIUM SERPL-SCNC: 3.9 MMOL/L (ref 3.4–5.1)
PROT SERPL-MCNC: 7.4 G/DL (ref 6.4–8.2)
RBC # BLD: 5.08 MIL/MCL (ref 4.5–5.9)
SODIUM SERPL-SCNC: 137 MMOL/L (ref 135–145)
TYPE AND SCREEN EXPIRATION DATE: NORMAL
WBC # BLD: 4.4 K/MCL (ref 4.2–11)

## 2023-12-12 PROCEDURE — 86850 RBC ANTIBODY SCREEN: CPT | Performed by: INTERNAL MEDICINE

## 2023-12-12 PROCEDURE — 86901 BLOOD TYPING SEROLOGIC RH(D): CPT | Performed by: INTERNAL MEDICINE

## 2023-12-12 PROCEDURE — 80053 COMPREHEN METABOLIC PANEL: CPT | Performed by: INTERNAL MEDICINE

## 2023-12-12 PROCEDURE — 85025 COMPLETE CBC W/AUTO DIFF WBC: CPT | Performed by: INTERNAL MEDICINE

## 2023-12-12 PROCEDURE — 36415 COLL VENOUS BLD VENIPUNCTURE: CPT | Performed by: PEDIATRICS

## 2023-12-12 PROCEDURE — 71046 X-RAY EXAM CHEST 2 VIEWS: CPT

## 2023-12-12 PROCEDURE — 86900 BLOOD TYPING SEROLOGIC ABO: CPT | Performed by: INTERNAL MEDICINE

## 2023-12-14 ENCOUNTER — TELEPHONE (OUTPATIENT)
Dept: CARDIOLOGY | Age: 43
End: 2023-12-14

## 2023-12-15 ENCOUNTER — TELEPHONE (OUTPATIENT)
Dept: CARDIOLOGY | Age: 43
End: 2023-12-15

## 2023-12-19 ENCOUNTER — APPOINTMENT (OUTPATIENT)
Dept: PEDIATRIC CARDIOLOGY | Age: 43
End: 2023-12-19
Attending: PEDIATRICS

## 2023-12-19 ENCOUNTER — ANESTHESIA EVENT (OUTPATIENT)
Dept: CARDIOLOGY | Age: 43
End: 2023-12-19

## 2023-12-19 ENCOUNTER — ANESTHESIA (OUTPATIENT)
Dept: CARDIOLOGY | Age: 43
End: 2023-12-19

## 2023-12-19 ENCOUNTER — HOSPITAL ENCOUNTER (OUTPATIENT)
Age: 43
Setting detail: OBSERVATION
Discharge: HOME | End: 2023-12-20
Attending: PEDIATRICS | Admitting: SPECIALIST

## 2023-12-19 LAB
ACT BLD: 136 BASELINE/TARGET RANGES ARE SET BY CLINICIANS FOR EACH PATIENT/PROCEDURE
ACT BLD: 236 BASELINE/TARGET RANGES ARE SET BY CLINICIANS FOR EACH PATIENT/PROCEDURE
ATRIAL RATE (BPM): 69
BASE EXCESS BLDA CALC-SCNC: 2 MMOL/L (ref -2–3)
BASE EXCESS BLDA CALC-SCNC: 3 MMOL/L (ref -2–3)
BDY SITE: ABNORMAL
BDY SITE: ABNORMAL
BODY TEMPERATURE: 37 DEGREES C
BODY TEMPERATURE: 37 DEGREES C
BSA FOR PED ECHO PROCEDURE: 1.98 M2
CA-I BLD-SCNC: 1.2 MMOL/L (ref 1.15–1.29)
CA-I BLD-SCNC: 1.22 MMOL/L (ref 1.15–1.29)
COHGB MFR BLD: 1.5 %
COHGB MFR BLD: 1.5 %
GLUCOSE BLD-MCNC: 100 MG/DL (ref 70–99)
GLUCOSE BLD-MCNC: 99 MG/DL (ref 70–99)
HCO3 BLDA-SCNC: 27 MMOL/L (ref 22–28)
HCO3 BLDA-SCNC: 27 MMOL/L (ref 22–28)
HGB BLD CALC-MCNC: 14.5 G/DL (ref 13–17)
HGB BLD CALC-MCNC: 14.7 G/DL (ref 13–17)
LACTATE BLDA-SCNC: 0.7 MMOL/L
LACTATE BLDA-SCNC: 0.9 MMOL/L
METHGB MFR BLD: 0.9 %
METHGB MFR BLD: 1.1 %
OXYHGB MFR BLDA: 95.8 % (ref 94–98)
OXYHGB MFR BLDA: 96.3 % (ref 94–98)
P AXIS (DEGREES): 67
PA AA PRP-ACNC: 559 ARU
PCO2 BLDA: 40 MM HG (ref 35–48)
PCO2 BLDA: 40 MM HG (ref 35–48)
PH BLDA: 7.43 UNITS (ref 7.35–7.45)
PH BLDA: 7.44 UNITS (ref 7.35–7.45)
PO2 BLDA: 87 MM HG (ref 83–108)
PO2 BLDA: 97 MM HG (ref 83–108)
POTASSIUM BLD-SCNC: 4 MMOL/L (ref 3.4–5.1)
POTASSIUM BLD-SCNC: 4.2 MMOL/L (ref 3.4–5.1)
PR-INTERVAL (MSEC): 154
QRS-INTERVAL (MSEC): 82
QT-INTERVAL (MSEC): 370
QTC: 396
R AXIS (DEGREES): 55
REPORT TEXT: NORMAL
SAO2 % BLDA: 20 % (ref 15–23)
SAO2 % BLDA: 20 % (ref 15–23)
SAO2 % BLDA: 98 % (ref 95–99)
SAO2 % BLDA: 99 % (ref 95–99)
SODIUM BLD-SCNC: 135 MMOL/L (ref 135–145)
SODIUM BLD-SCNC: 136 MMOL/L (ref 135–145)
SPECIMEN CONDITION: ABNORMAL
SPECIMEN CONDITION: ABNORMAL
T AXIS (DEGREES): 58
VENTRICULAR RATE EKG/MIN (BPM): 69

## 2023-12-19 PROCEDURE — C1817 SEPTAL DEFECT IMP SYS: HCPCS | Performed by: PEDIATRICS

## 2023-12-19 PROCEDURE — 10002803 HB RX 637: Performed by: STUDENT IN AN ORGANIZED HEALTH CARE EDUCATION/TRAINING PROGRAM

## 2023-12-19 PROCEDURE — 13000001 HB PHASE II RECOVERY EA 30 MINUTES: Performed by: PEDIATRICS

## 2023-12-19 PROCEDURE — 13000004 HB  ANESTHESIA  GENERAL OUTSIDE OR: Performed by: PEDIATRICS

## 2023-12-19 PROCEDURE — 10002800 HB RX 250 W HCPCS: Performed by: PEDIATRICS

## 2023-12-19 PROCEDURE — 83605 ASSAY OF LACTIC ACID: CPT

## 2023-12-19 PROCEDURE — 84295 ASSAY OF SERUM SODIUM: CPT

## 2023-12-19 PROCEDURE — 93580 TRANSCATH CLOSURE OF ASD: CPT | Performed by: PEDIATRICS

## 2023-12-19 PROCEDURE — 93005 ELECTROCARDIOGRAM TRACING: CPT | Performed by: PEDIATRICS

## 2023-12-19 PROCEDURE — G0378 HOSPITAL OBSERVATION PER HR: HCPCS

## 2023-12-19 PROCEDURE — 10006023 HB SUPPLY 272: Performed by: PEDIATRICS

## 2023-12-19 PROCEDURE — C1769 GUIDE WIRE: HCPCS | Performed by: PEDIATRICS

## 2023-12-19 PROCEDURE — 10004651 HB RX, NO CHARGE ITEM: Performed by: INTERNAL MEDICINE

## 2023-12-19 PROCEDURE — 10002807 HB RX 258: Performed by: STUDENT IN AN ORGANIZED HEALTH CARE EDUCATION/TRAINING PROGRAM

## 2023-12-19 PROCEDURE — 85018 HEMOGLOBIN: CPT

## 2023-12-19 PROCEDURE — 10002801 HB RX 250 W/O HCPCS: Performed by: STUDENT IN AN ORGANIZED HEALTH CARE EDUCATION/TRAINING PROGRAM

## 2023-12-19 PROCEDURE — 93325 DOPPLER ECHO COLOR FLOW MAPG: CPT | Performed by: PEDIATRICS

## 2023-12-19 PROCEDURE — 93315 ECHO TRANSESOPHAGEAL: CPT | Performed by: PEDIATRICS

## 2023-12-19 PROCEDURE — C1894 INTRO/SHEATH, NON-LASER: HCPCS | Performed by: PEDIATRICS

## 2023-12-19 PROCEDURE — 85347 COAGULATION TIME ACTIVATED: CPT | Performed by: PEDIATRICS

## 2023-12-19 PROCEDURE — 10004651 HB RX, NO CHARGE ITEM: Performed by: STUDENT IN AN ORGANIZED HEALTH CARE EDUCATION/TRAINING PROGRAM

## 2023-12-19 PROCEDURE — 93320 DOPPLER ECHO COMPLETE: CPT | Performed by: PEDIATRICS

## 2023-12-19 PROCEDURE — 10002800 HB RX 250 W HCPCS: Performed by: STUDENT IN AN ORGANIZED HEALTH CARE EDUCATION/TRAINING PROGRAM

## 2023-12-19 PROCEDURE — 85576 BLOOD PLATELET AGGREGATION: CPT | Performed by: PEDIATRICS

## 2023-12-19 PROCEDURE — 93315 ECHO TRANSESOPHAGEAL: CPT

## 2023-12-19 PROCEDURE — 10006027 HB SUPPLY 278: Performed by: PEDIATRICS

## 2023-12-19 DEVICE — PFO OCCLUDER
Type: IMPLANTABLE DEVICE | Site: ATRIUM | Status: FUNCTIONAL
Brand: AMPLATZER™ TALISMAN™

## 2023-12-19 RX ORDER — PROTAMINE SULFATE 10 MG/ML
INJECTION, SOLUTION INTRAVENOUS PRN
Status: DISCONTINUED | OUTPATIENT
Start: 2023-12-19 | End: 2023-12-19

## 2023-12-19 RX ORDER — HEPARIN SODIUM 1000 [USP'U]/ML
INJECTION, SOLUTION INTRAVENOUS; SUBCUTANEOUS PRN
Status: DISCONTINUED | OUTPATIENT
Start: 2023-12-19 | End: 2023-12-19 | Stop reason: HOSPADM

## 2023-12-19 RX ORDER — ACETAMINOPHEN 325 MG/1
650 TABLET ORAL EVERY 6 HOURS PRN
Status: DISCONTINUED | OUTPATIENT
Start: 2023-12-19 | End: 2023-12-20 | Stop reason: HOSPADM

## 2023-12-19 RX ORDER — EPHEDRINE SULFATE/0.9% NACL/PF 50 MG/10ML
5 SYRINGE (ML) INTRAVENOUS
Status: DISCONTINUED | OUTPATIENT
Start: 2023-12-19 | End: 2023-12-19 | Stop reason: HOSPADM

## 2023-12-19 RX ORDER — ASPIRIN 81 MG/1
81 TABLET, CHEWABLE ORAL DAILY
Status: DISCONTINUED | OUTPATIENT
Start: 2023-12-20 | End: 2023-12-20 | Stop reason: HOSPADM

## 2023-12-19 RX ORDER — GLYCOPYRROLATE 0.2 MG/ML
INJECTION, SOLUTION INTRAMUSCULAR; INTRAVENOUS PRN
Status: DISCONTINUED | OUTPATIENT
Start: 2023-12-19 | End: 2023-12-19

## 2023-12-19 RX ORDER — 0.9 % SODIUM CHLORIDE 0.9 %
2 VIAL (ML) INJECTION EVERY 12 HOURS SCHEDULED
Status: DISCONTINUED | OUTPATIENT
Start: 2023-12-19 | End: 2023-12-20 | Stop reason: HOSPADM

## 2023-12-19 RX ORDER — NALOXONE HCL 0.4 MG/ML
0.2 VIAL (ML) INJECTION EVERY 5 MIN PRN
Status: DISCONTINUED | OUTPATIENT
Start: 2023-12-19 | End: 2023-12-19 | Stop reason: HOSPADM

## 2023-12-19 RX ORDER — ASPIRIN 325 MG
325 TABLET, DELAYED RELEASE (ENTERIC COATED) ORAL DAILY
Status: DISCONTINUED | OUTPATIENT
Start: 2023-12-19 | End: 2023-12-19

## 2023-12-19 RX ORDER — ONDANSETRON 2 MG/ML
INJECTION INTRAMUSCULAR; INTRAVENOUS PRN
Status: DISCONTINUED | OUTPATIENT
Start: 2023-12-19 | End: 2023-12-19

## 2023-12-19 RX ORDER — PROMETHAZINE HYDROCHLORIDE 25 MG/1
25 TABLET ORAL EVERY 6 HOURS PRN
Status: DISCONTINUED | OUTPATIENT
Start: 2023-12-19 | End: 2023-12-19 | Stop reason: HOSPADM

## 2023-12-19 RX ORDER — BUPIVACAINE HYDROCHLORIDE 2.5 MG/ML
INJECTION, SOLUTION EPIDURAL; INFILTRATION; INTRACAUDAL PRN
Status: DISCONTINUED | OUTPATIENT
Start: 2023-12-19 | End: 2023-12-19 | Stop reason: HOSPADM

## 2023-12-19 RX ORDER — NEOSTIGMINE METHYLSULFATE 1 MG/ML
INJECTION, SOLUTION INTRAVENOUS PRN
Status: DISCONTINUED | OUTPATIENT
Start: 2023-12-19 | End: 2023-12-19

## 2023-12-19 RX ORDER — HYDRALAZINE HYDROCHLORIDE 20 MG/ML
5 INJECTION INTRAMUSCULAR; INTRAVENOUS EVERY 10 MIN PRN
Status: DISCONTINUED | OUTPATIENT
Start: 2023-12-19 | End: 2023-12-19 | Stop reason: HOSPADM

## 2023-12-19 RX ORDER — NALBUPHINE HYDROCHLORIDE 10 MG/ML
2.5 INJECTION, SOLUTION INTRAMUSCULAR; INTRAVENOUS; SUBCUTANEOUS
Status: DISCONTINUED | OUTPATIENT
Start: 2023-12-19 | End: 2023-12-19 | Stop reason: HOSPADM

## 2023-12-19 RX ORDER — CLOPIDOGREL 300 MG/1
600 TABLET, FILM COATED ORAL ONCE
Status: COMPLETED | OUTPATIENT
Start: 2023-12-19 | End: 2023-12-19

## 2023-12-19 RX ORDER — METOCLOPRAMIDE HYDROCHLORIDE 5 MG/ML
5 INJECTION INTRAMUSCULAR; INTRAVENOUS EVERY 6 HOURS PRN
Status: DISCONTINUED | OUTPATIENT
Start: 2023-12-19 | End: 2023-12-19 | Stop reason: HOSPADM

## 2023-12-19 RX ORDER — ACETAMINOPHEN 325 MG/1
650 TABLET ORAL
Status: ACTIVE | OUTPATIENT
Start: 2023-12-19 | End: 2023-12-19

## 2023-12-19 RX ORDER — DEXAMETHASONE SODIUM PHOSPHATE 4 MG/ML
INJECTION, SOLUTION INTRA-ARTICULAR; INTRALESIONAL; INTRAMUSCULAR; INTRAVENOUS; SOFT TISSUE PRN
Status: DISCONTINUED | OUTPATIENT
Start: 2023-12-19 | End: 2023-12-19

## 2023-12-19 RX ORDER — MIDAZOLAM HYDROCHLORIDE 1 MG/ML
INJECTION, SOLUTION INTRAMUSCULAR; INTRAVENOUS PRN
Status: DISCONTINUED | OUTPATIENT
Start: 2023-12-19 | End: 2023-12-19

## 2023-12-19 RX ORDER — SODIUM CHLORIDE 9 MG/ML
INJECTION, SOLUTION INTRAVENOUS CONTINUOUS
Status: DISCONTINUED | OUTPATIENT
Start: 2023-12-19 | End: 2023-12-20 | Stop reason: HOSPADM

## 2023-12-19 RX ORDER — PROPOFOL 10 MG/ML
INJECTION, EMULSION INTRAVENOUS PRN
Status: DISCONTINUED | OUTPATIENT
Start: 2023-12-19 | End: 2023-12-19

## 2023-12-19 RX ORDER — ROCURONIUM BROMIDE 10 MG/ML
INJECTION, SOLUTION INTRAVENOUS PRN
Status: DISCONTINUED | OUTPATIENT
Start: 2023-12-19 | End: 2023-12-19

## 2023-12-19 RX ORDER — DIPHENHYDRAMINE HYDROCHLORIDE 50 MG/ML
25 INJECTION INTRAMUSCULAR; INTRAVENOUS EVERY 4 HOURS PRN
Status: DISCONTINUED | OUTPATIENT
Start: 2023-12-19 | End: 2023-12-19 | Stop reason: HOSPADM

## 2023-12-19 RX ORDER — LIDOCAINE HYDROCHLORIDE 20 MG/ML
INJECTION, SOLUTION INFILTRATION; PERINEURAL PRN
Status: DISCONTINUED | OUTPATIENT
Start: 2023-12-19 | End: 2023-12-19

## 2023-12-19 RX ORDER — DIPHENHYDRAMINE HYDROCHLORIDE 50 MG/ML
12.5 INJECTION INTRAMUSCULAR; INTRAVENOUS EVERY 4 HOURS PRN
Status: DISCONTINUED | OUTPATIENT
Start: 2023-12-19 | End: 2023-12-19 | Stop reason: HOSPADM

## 2023-12-19 RX ORDER — DROPERIDOL 2.5 MG/ML
0.62 INJECTION, SOLUTION INTRAMUSCULAR; INTRAVENOUS
Status: DISCONTINUED | OUTPATIENT
Start: 2023-12-19 | End: 2023-12-19 | Stop reason: HOSPADM

## 2023-12-19 RX ORDER — ONDANSETRON 2 MG/ML
4 INJECTION INTRAMUSCULAR; INTRAVENOUS 2 TIMES DAILY PRN
Status: DISCONTINUED | OUTPATIENT
Start: 2023-12-19 | End: 2023-12-19 | Stop reason: HOSPADM

## 2023-12-19 RX ORDER — SODIUM CHLORIDE, SODIUM LACTATE, POTASSIUM CHLORIDE, CALCIUM CHLORIDE 600; 310; 30; 20 MG/100ML; MG/100ML; MG/100ML; MG/100ML
INJECTION, SOLUTION INTRAVENOUS CONTINUOUS PRN
Status: DISCONTINUED | OUTPATIENT
Start: 2023-12-19 | End: 2023-12-19

## 2023-12-19 RX ORDER — CLOPIDOGREL BISULFATE 75 MG/1
75 TABLET ORAL DAILY
Status: DISCONTINUED | OUTPATIENT
Start: 2023-12-20 | End: 2023-12-20 | Stop reason: HOSPADM

## 2023-12-19 RX ORDER — DEXAMETHASONE SODIUM PHOSPHATE 4 MG/ML
4 INJECTION, SOLUTION INTRA-ARTICULAR; INTRALESIONAL; INTRAMUSCULAR; INTRAVENOUS; SOFT TISSUE
Status: DISCONTINUED | OUTPATIENT
Start: 2023-12-19 | End: 2023-12-19 | Stop reason: HOSPADM

## 2023-12-19 RX ORDER — PROCHLORPERAZINE EDISYLATE 5 MG/ML
5 INJECTION INTRAMUSCULAR; INTRAVENOUS EVERY 4 HOURS PRN
Status: DISCONTINUED | OUTPATIENT
Start: 2023-12-19 | End: 2023-12-19 | Stop reason: HOSPADM

## 2023-12-19 RX ORDER — HEPARIN SODIUM 200 [USP'U]/100ML
INJECTION, SOLUTION INTRAVENOUS PRN
Status: DISCONTINUED | OUTPATIENT
Start: 2023-12-19 | End: 2023-12-19 | Stop reason: HOSPADM

## 2023-12-19 RX ADMIN — FENTANYL CITRATE 50 MCG: 50 INJECTION INTRAMUSCULAR; INTRAVENOUS at 13:03

## 2023-12-19 RX ADMIN — CLOPIDOGREL BISULFATE 600 MG: 300 TABLET, FILM COATED ORAL at 17:04

## 2023-12-19 RX ADMIN — MIDAZOLAM HYDROCHLORIDE 2 MG: 1 INJECTION, SOLUTION INTRAMUSCULAR; INTRAVENOUS at 11:45

## 2023-12-19 RX ADMIN — ROCURONIUM BROMIDE 50 MG: 10 INJECTION INTRAVENOUS at 11:50

## 2023-12-19 RX ADMIN — CEFAZOLIN SODIUM 2000 MG: 300 INJECTION, POWDER, LYOPHILIZED, FOR SOLUTION INTRAVENOUS at 12:02

## 2023-12-19 RX ADMIN — PROTAMINE SULFATE 25 MG: 10 INJECTION, SOLUTION INTRAVENOUS at 12:39

## 2023-12-19 RX ADMIN — ONDANSETRON 4 MG: 2 INJECTION INTRAMUSCULAR; INTRAVENOUS at 13:54

## 2023-12-19 RX ADMIN — SODIUM CHLORIDE, POTASSIUM CHLORIDE, SODIUM LACTATE AND CALCIUM CHLORIDE: 600; 310; 30; 20 INJECTION, SOLUTION INTRAVENOUS at 11:40

## 2023-12-19 RX ADMIN — LIDOCAINE HYDROCHLORIDE 4 ML: 20 INJECTION, SOLUTION INFILTRATION; PERINEURAL at 11:50

## 2023-12-19 RX ADMIN — ONDANSETRON 4 MG: 2 INJECTION INTRAMUSCULAR; INTRAVENOUS at 12:34

## 2023-12-19 RX ADMIN — PROPOFOL 150 MG: 10 INJECTION, EMULSION INTRAVENOUS at 11:50

## 2023-12-19 RX ADMIN — ACETAMINOPHEN 650 MG: 325 TABLET ORAL at 23:03

## 2023-12-19 RX ADMIN — NEOSTIGMINE METHYLSULFATE 5 MG: 1 INJECTION INTRAVENOUS at 13:03

## 2023-12-19 RX ADMIN — SODIUM CHLORIDE 2 ML: 9 INJECTION, SOLUTION INTRAMUSCULAR; INTRAVENOUS; SUBCUTANEOUS at 20:51

## 2023-12-19 RX ADMIN — GLYCOPYRROLATE 0.8 MG: 0.2 INJECTION INTRAMUSCULAR; INTRAVENOUS at 13:03

## 2023-12-19 RX ADMIN — DEXAMETHASONE SODIUM PHOSPHATE 10 MG: 4 INJECTION INTRA-ARTICULAR; INTRALESIONAL; INTRAMUSCULAR; INTRAVENOUS; SOFT TISSUE at 12:06

## 2023-12-19 RX ADMIN — FENTANYL CITRATE 50 MCG: 50 INJECTION INTRAMUSCULAR; INTRAVENOUS at 11:48

## 2023-12-19 SDOH — SOCIAL STABILITY: SOCIAL NETWORK
HOW OFTEN DO YOU SEE OR TALK TO PEOPLE THAT YOU CARE ABOUT AND FEEL CLOSE TO? (FOR EXAMPLE: TALKING TO FRIENDS ON THE PHONE, VISITING FRIENDS OR FAMILY, GOING TO CHURCH OR CLUB MEETINGS): 5 OR MORE TIMES A WEEK

## 2023-12-19 SDOH — SOCIAL STABILITY: SOCIAL INSECURITY: HOW OFTEN DOES ANYONE, INCLUDING FAMILY AND FRIENDS, SCREAM OR CURSE AT YOU?: NEVER

## 2023-12-19 SDOH — SOCIAL STABILITY: SOCIAL INSECURITY: HOW OFTEN DOES ANYONE, INCLUDING FAMILY AND FRIENDS, THREATEN YOU WITH HARM?: NEVER

## 2023-12-19 SDOH — ECONOMIC STABILITY: FOOD INSECURITY: WITHIN THE PAST 12 MONTHS, THE FOOD YOU BOUGHT JUST DIDN'T LAST AND YOU DIDN'T HAVE MONEY TO GET MORE.: NEVER TRUE

## 2023-12-19 SDOH — SOCIAL STABILITY: SOCIAL INSECURITY: HOW OFTEN DOES ANYONE, INCLUDING FAMILY AND FRIENDS, PHYSICALLY HURT YOU?: NEVER

## 2023-12-19 SDOH — SOCIAL STABILITY: SOCIAL INSECURITY: HOW OFTEN DOES ANYONE, INCLUDING FAMILY AND FRIENDS, INSULT OR TALK DOWN TO YOU?: NEVER

## 2023-12-19 ASSESSMENT — ENCOUNTER SYMPTOMS: EXERCISE TOLERANCE: GOOD (>4 METS)

## 2023-12-19 ASSESSMENT — PATIENT HEALTH QUESTIONNAIRE - PHQ9
SUM OF ALL RESPONSES TO PHQ9 QUESTIONS 1 AND 2: 0
CLINICAL INTERPRETATION OF PHQ2 SCORE: NO FURTHER SCREENING NEEDED
2. FEELING DOWN, DEPRESSED OR HOPELESS: NOT AT ALL
IS PATIENT ABLE TO COMPLETE PHQ2 OR PHQ9: YES
SUM OF ALL RESPONSES TO PHQ9 QUESTIONS 1 AND 2: 0
1. LITTLE INTEREST OR PLEASURE IN DOING THINGS: NOT AT ALL
2. FEELING DOWN, DEPRESSED OR HOPELESS: NOT AT ALL
1. LITTLE INTEREST OR PLEASURE IN DOING THINGS: NOT AT ALL
IS PATIENT ABLE TO COMPLETE PHQ2 OR PHQ9: YES
CLINICAL INTERPRETATION OF PHQ2 SCORE: NO FURTHER SCREENING NEEDED

## 2023-12-19 ASSESSMENT — COLUMBIA-SUICIDE SEVERITY RATING SCALE - C-SSRS
IS THE PATIENT ABLE TO COMPLETE C-SSRS: YES
1. WITHIN THE PAST MONTH, HAVE YOU WISHED YOU WERE DEAD OR WISHED YOU COULD GO TO SLEEP AND NOT WAKE UP?: NO
6. HAVE YOU EVER DONE ANYTHING, STARTED TO DO ANYTHING, OR PREPARED TO DO ANYTHING TO END YOUR LIFE?: NO
2. HAVE YOU ACTUALLY HAD ANY THOUGHTS OF KILLING YOURSELF?: NO
1. WITHIN THE PAST MONTH, HAVE YOU WISHED YOU WERE DEAD OR WISHED YOU COULD GO TO SLEEP AND NOT WAKE UP?: NO
IS THE PATIENT ABLE TO COMPLETE C-SSRS: YES
2. HAVE YOU ACTUALLY HAD ANY THOUGHTS OF KILLING YOURSELF?: NO
6. HAVE YOU EVER DONE ANYTHING, STARTED TO DO ANYTHING, OR PREPARED TO DO ANYTHING TO END YOUR LIFE?: NO

## 2023-12-19 ASSESSMENT — LIFESTYLE VARIABLES
HOW MANY STANDARD DRINKS CONTAINING ALCOHOL DO YOU HAVE ON A TYPICAL DAY: 0,1 OR 2
HOW OFTEN DO YOU HAVE 6 OR MORE DRINKS ON ONE OCCASION: NEVER
HOW OFTEN DO YOU HAVE A DRINK CONTAINING ALCOHOL: NEVER
AUDIT-C TOTAL SCORE: 0
ALCOHOL_USE_STATUS: NO OR LOW RISK WITH VALIDATED TOOL

## 2023-12-19 ASSESSMENT — PAIN SCALES - GENERAL
PAINLEVEL_OUTOF10: 4
PAINLEVEL_OUTOF10: 0

## 2023-12-19 ASSESSMENT — ACTIVITIES OF DAILY LIVING (ADL)
RECENT_DECLINE_ADL: NO
ADL_SHORT_OF_BREATH: NO
ADL_SCORE: 12
ADL_BEFORE_ADMISSION: INDEPENDENT

## 2023-12-20 ENCOUNTER — APPOINTMENT (OUTPATIENT)
Dept: PEDIATRIC CARDIOLOGY | Age: 43
End: 2023-12-20
Attending: STUDENT IN AN ORGANIZED HEALTH CARE EDUCATION/TRAINING PROGRAM

## 2023-12-20 ENCOUNTER — APPOINTMENT (OUTPATIENT)
Dept: GENERAL RADIOLOGY | Age: 43
End: 2023-12-20
Attending: STUDENT IN AN ORGANIZED HEALTH CARE EDUCATION/TRAINING PROGRAM

## 2023-12-20 VITALS
RESPIRATION RATE: 16 BRPM | TEMPERATURE: 97.7 F | BODY MASS INDEX: 28.37 KG/M2 | DIASTOLIC BLOOD PRESSURE: 81 MMHG | WEIGHT: 187.17 LBS | HEIGHT: 68 IN | SYSTOLIC BLOOD PRESSURE: 124 MMHG | OXYGEN SATURATION: 98 % | HEART RATE: 73 BPM

## 2023-12-20 LAB
AORTIC ROOT: 3.3 CM (ref 2.43–3.45)
AORTIC VALVE ANNULUS: 2.1 CM (ref 1.72–2.51)
BSA FOR PED ECHO PROCEDURE: 2.04 M2
EJECTION FRACTION: 62 %
LEFT VENTRICLE EJECTION FRACTION BY SIMPSON 2 CHAMBER (%): 72 %
LEFT VENTRICLE EJECTION FRACTION BY SIMPSON BP (%): 66 %
SINOTUBULAR JUNCTION: 2.8 CM (ref 1.96–2.87)
Z SCORE OF AORTIC VALVE ANNULUS PHN: -0.1 CM
Z-SCORE OF AORTIC ROOT: 1.4 CM
Z-SCORE OF SINOTUBULAR JUNCTION PHN: 1.7 CM

## 2023-12-20 PROCEDURE — 99238 HOSP IP/OBS DSCHRG MGMT 30/<: CPT | Performed by: NURSE PRACTITIONER

## 2023-12-20 PROCEDURE — 71046 X-RAY EXAM CHEST 2 VIEWS: CPT

## 2023-12-20 PROCEDURE — 93010 ELECTROCARDIOGRAM REPORT: CPT | Performed by: INTERNAL MEDICINE

## 2023-12-20 PROCEDURE — 10004651 HB RX, NO CHARGE ITEM: Performed by: INTERNAL MEDICINE

## 2023-12-20 PROCEDURE — 93303 ECHO TRANSTHORACIC: CPT

## 2023-12-20 PROCEDURE — 10002800 HB RX 250 W HCPCS: Performed by: NURSE PRACTITIONER

## 2023-12-20 PROCEDURE — 10004651 HB RX, NO CHARGE ITEM: Performed by: STUDENT IN AN ORGANIZED HEALTH CARE EDUCATION/TRAINING PROGRAM

## 2023-12-20 PROCEDURE — 93325 DOPPLER ECHO COLOR FLOW MAPG: CPT | Performed by: PEDIATRICS

## 2023-12-20 PROCEDURE — 93005 ELECTROCARDIOGRAM TRACING: CPT | Performed by: STUDENT IN AN ORGANIZED HEALTH CARE EDUCATION/TRAINING PROGRAM

## 2023-12-20 PROCEDURE — 93303 ECHO TRANSTHORACIC: CPT | Performed by: PEDIATRICS

## 2023-12-20 PROCEDURE — 93320 DOPPLER ECHO COMPLETE: CPT | Performed by: PEDIATRICS

## 2023-12-20 PROCEDURE — G0378 HOSPITAL OBSERVATION PER HR: HCPCS

## 2023-12-20 PROCEDURE — 10002803 HB RX 637: Performed by: STUDENT IN AN ORGANIZED HEALTH CARE EDUCATION/TRAINING PROGRAM

## 2023-12-20 RX ORDER — ASPIRIN 81 MG/1
81 TABLET, CHEWABLE ORAL DAILY
Qty: 30 TABLET | Refills: 0 | Status: SHIPPED | OUTPATIENT
Start: 2023-12-20

## 2023-12-20 RX ORDER — CLOPIDOGREL BISULFATE 75 MG/1
75 TABLET ORAL DAILY
Qty: 30 TABLET | Refills: 0 | Status: SHIPPED | OUTPATIENT
Start: 2023-12-21

## 2023-12-20 RX ADMIN — ASPIRIN 81 MG CHEWABLE TABLET 81 MG: 81 TABLET CHEWABLE at 08:23

## 2023-12-20 RX ADMIN — Medication 5000 UNITS: at 08:23

## 2023-12-20 RX ADMIN — CEFAZOLIN SODIUM 2000 MG: 300 INJECTION, POWDER, LYOPHILIZED, FOR SOLUTION INTRAVENOUS at 14:29

## 2023-12-20 RX ADMIN — ACETAMINOPHEN 650 MG: 325 TABLET ORAL at 08:28

## 2023-12-20 RX ADMIN — CLOPIDOGREL BISULFATE 75 MG: 75 TABLET, FILM COATED ORAL at 08:23

## 2023-12-20 SDOH — ECONOMIC STABILITY: HOUSING INSECURITY: WHAT IS YOUR LIVING SITUATION TODAY?: I HAVE A STEADY PLACE TO LIVE

## 2023-12-20 ASSESSMENT — COGNITIVE AND FUNCTIONAL STATUS - GENERAL
BECAUSE OF A PHYSICAL, MENTAL, OR EMOTIONAL CONDITION, DO YOU HAVE DIFFICULTY DOING ERRANDS ALONE: NO
DO YOU HAVE SERIOUS DIFFICULTY WALKING OR CLIMBING STAIRS: NO
BECAUSE OF A PHYSICAL, MENTAL, OR EMOTIONAL CONDITION, DO YOU HAVE SERIOUS DIFFICULTY CONCENTRATING, REMEMBERING OR MAKING DECISIONS: NO
DO YOU HAVE DIFFICULTY DRESSING OR BATHING: NO

## 2023-12-20 ASSESSMENT — PAIN SCALES - GENERAL
PAINLEVEL_OUTOF10: 3
PAINLEVEL_OUTOF10: 0
PAINLEVEL_OUTOF10: 1

## 2023-12-21 LAB
ATRIAL RATE (BPM): 73
P AXIS (DEGREES): 6
PR-INTERVAL (MSEC): 154
QRS-INTERVAL (MSEC): 98
QT-INTERVAL (MSEC): 380
QTC: 419
R AXIS (DEGREES): 9
REPORT TEXT: NORMAL
T AXIS (DEGREES): 6
VENTRICULAR RATE EKG/MIN (BPM): 73

## 2024-01-11 RX ORDER — ASPIRIN 81 MG/1
81 TABLET, CHEWABLE ORAL DAILY
Qty: 90 TABLET | Refills: 1 | Status: SHIPPED | OUTPATIENT
Start: 2024-01-11

## 2024-01-11 RX ORDER — CLOPIDOGREL BISULFATE 75 MG/1
75 TABLET ORAL DAILY
Qty: 90 TABLET | Refills: 1 | Status: SHIPPED | OUTPATIENT
Start: 2024-01-11

## 2024-01-15 PROBLEM — Z87.74 STATUS POST DEVICE CLOSURE OF ASD: Status: ACTIVE | Noted: 2024-01-15

## 2024-01-18 ENCOUNTER — OFFICE VISIT (OUTPATIENT)
Dept: NEUROLOGY | Facility: CLINIC | Age: 44
End: 2024-01-18
Payer: COMMERCIAL

## 2024-01-18 ENCOUNTER — TELEPHONE (OUTPATIENT)
Dept: NEUROLOGY | Facility: CLINIC | Age: 44
End: 2024-01-18

## 2024-01-18 VITALS
HEIGHT: 67 IN | BODY MASS INDEX: 29.03 KG/M2 | RESPIRATION RATE: 16 BRPM | WEIGHT: 185 LBS | HEART RATE: 80 BPM | SYSTOLIC BLOOD PRESSURE: 108 MMHG | DIASTOLIC BLOOD PRESSURE: 68 MMHG

## 2024-01-18 DIAGNOSIS — Q21.12 PFO (PATENT FORAMEN OVALE): ICD-10-CM

## 2024-01-18 DIAGNOSIS — R41.3 COMPLAINTS OF MEMORY DISTURBANCE: ICD-10-CM

## 2024-01-18 DIAGNOSIS — Z86.69 HX OF OPTIC NEURITIS: ICD-10-CM

## 2024-01-18 DIAGNOSIS — G35 MS (MULTIPLE SCLEROSIS) (HCC): Primary | ICD-10-CM

## 2024-01-18 DIAGNOSIS — R83.8 CSF OLIGOCLONAL BANDS: ICD-10-CM

## 2024-01-18 PROCEDURE — 3078F DIAST BP <80 MM HG: CPT | Performed by: PHYSICIAN ASSISTANT

## 2024-01-18 PROCEDURE — 99214 OFFICE O/P EST MOD 30 MIN: CPT | Performed by: PHYSICIAN ASSISTANT

## 2024-01-18 PROCEDURE — 3074F SYST BP LT 130 MM HG: CPT | Performed by: PHYSICIAN ASSISTANT

## 2024-01-18 PROCEDURE — 3008F BODY MASS INDEX DOCD: CPT | Performed by: PHYSICIAN ASSISTANT

## 2024-01-18 RX ORDER — ASPIRIN 81 MG/1
81 TABLET, CHEWABLE ORAL DAILY
COMMUNITY
Start: 2023-12-20

## 2024-01-18 RX ORDER — CLOPIDOGREL BISULFATE 75 MG/1
75 TABLET ORAL DAILY
COMMUNITY
Start: 2024-01-11

## 2024-01-18 NOTE — PROGRESS NOTES
NEUROLOGY  [facility]    Previous visit and existing record notes reviewed in preparation for the face to face visit.  Relevant labs and studies reviewed and will be noted in relevant areas of this record.    Juan Peters  1/9/1980  Primary Care Provider:  Hero Linn MD    1/18/2024  Accompanied visit: No     44 year old male,      was last seen on: Patient was last seen in our office in August 2016 for History of thalamic stroke with the presence of PFO and abnormal MTHFR.     Seen for:  Multiple Sclerosis  Hx of Stroke      Present condition:  Since his last visit he was seen by neurologist Dr. Mo in November 2022 for left vision disturbance and eye pain as well as paresthesias in the left arm. He had stroke work-up and evaluation with neuro-ophthalmologist. He was diagnosed with Multiple Sclerosis 12/2022  He was then seen by Dr. Obando at [facility] for second opinion which was in agreement with relapsing-remitting MS  Had PFO closed last month  Was drinking alcohol at the time of the stroke in 2014. Has formed me that he has stopped drinking alcohol    Hx of left eye optic neuritis. He describes a persistent dimness in the left eye. Since October 2022. Was seen by neuropthalmologist Dr. Trevino  He did take an oral steroid for the optic neuritis  No paresthesias  No weakness of extermites  No urinary complaints  Has noticed some issues with some short term memories. Wife tells him to bring the gift cards. He set the gift cards down and then did not bring them with  Has noticed that he will be told something and forget  Is currently on the copaxone for about a year now. Has some minor injection site reactions but otherwise tolerates the copaxone        Mother- Lewy Body  Matenal Grandmother- Alzheimers  Maternal Aunt- Alzheimers  Paternal Grandmother- Alzeimers    MRI Brain, Cervical Thoracic Spine(6/19/23)  Stable supratentorial and  infratentorial demyelinating plaque burden. No abnormal   enhancement evident to suggest active demyelination. No definite cord lesions. No definite new   lesions detected. Minor cervical degenerative spondylotic changes, as described.     CSF  Comment: Eight (8) oligoclonal bands were observed in the CSF, which were not  detected in the serum sample.   ACE, CSF  0.0 - 2.5 U/L <1.5     Anti-Hu Ab  Negative Negative   Anti-Ri Ab  Negative Negative   Anti-Yo Ab  Negative Negative     JIGAR Screen  Negative Negative     Review of Systems:  Review of Systems:  Denies systemic symptoms     No CP or SOB.  No GI or  symptoms. Relevant Neuro as noted above.    Past Medical History:   Diagnosis Date    MS (multiple sclerosis) ([facility]) 12/2022    PFO (patent foramen ovale)     Stroke ([facility]) 02/2014    10/2022    Visual impairment     contacts/glasses         Medications:      Current Outpatient Medications:     clopidogrel 75 MG Oral Tab, Take 1 tablet (75 mg total) by mouth daily., Disp: , Rfl:     aspirin 81 MG Oral Chew Tab, Chew 1 tablet (81 mg total) by mouth daily., Disp: , Rfl:     Glatiramer Acetate (COPAXONE) 40 MG/ML Subcutaneous Solution Prefilled Syringe, Inject 40 mg into the skin once a week. Monday, Wednesday, Friday, Disp: , Rfl:     Loratadine 10 MG Oral Tablet Dispersible, Take 1 tablet (10 mg total) by mouth daily., Disp: , Rfl:     finasteride 1 MG Oral Tab, Take 1 tablet (1 mg total) by mouth daily., Disp: , Rfl:   PRN:     Allergies:  No Known Allergies       EXAM:  /68 (BP Location: Left arm, Patient Position: Sitting, Cuff Size: adult)   Pulse 80   Resp 16   Ht 67\"   Wt 185 lb (83.9 kg)   BMI 28.98 kg/m²   Looks stated age  General Exam:  HENT:  pink conjunctiva anicteric sclerae  Neck no adenopathy, thyroid normal  Heart and Lungs:  normal  Extremities: no cyanosis, skin changes    NEURO  NAD, A&Ox3  EOMI  CN II-XII intact  Strength 5/5 all extremities  DTRs symmetric  FTN intact  bilaterally  No drift on exam  Normal gait  Tandem gait intact  Romberg negative             Problem/s Identified this visit:   1. MS (multiple sclerosis) (HCC)    2. Hx of optic neuritis on the left    3. CSF oligoclonal bands    4. Complaints of memory disturbance    5. Hx of PFO closure          Discussion plus Diagnostics & Treatment Orders:    MS- currently stable. Continue the copaxone. Will plan to jeff follow-up MRIs Summer 2024 or sooner if new symptoms    Hx of Optic Neuritis- has mild persistent vision disturbance on the left    Memory Complaints- TSH ordered    Hx of PFO Closure December 2023- Following with Cardiology. Currently on ASA and Plavix          (X) Discussed potential side effects of any treatment relevant to above.  Includes explanation of tests as necessary.    Return in about 6 months (around 7/18/2024).      Patient understands that if needed, based on condition and or test results, follow up will be readjusted    Christy Woods PA-C  [facility]  1/18/2024, Time completed 3:47 PM

## 2024-01-18 NOTE — PATIENT INSTRUCTIONS
Refill policies:    Allow 2-3 business days for refills; controlled substances may take longer.  Contact your pharmacy at least 5 days prior to running out of medication and have them send an electronic request or submit request through the “request refill” option in your [employer] account.  Refills are not addressed on weekends; covering physicians do not authorize routine medications on weekends.  No narcotics or controlled substances are refilled after noon on Fridays or by on call physicians.  By law, narcotics must be electronically prescribed.  A 30 day supply with no refills is the maximum allowed.  If your prescription is due for a refill, you may be due for a follow up appointment.  To best provide you care, patients receiving routine medications need to be seen at least once a year.  Patients receiving narcotic/controlled substance medications need to be seen at least once every 3 months.  In the event that your preferred pharmacy does not have the requested medication in stock (e.g. Backordered), it is your responsibility to find another pharmacy that has the requested medication available.  We will gladly send a new prescription to that pharmacy at your request.    Scheduling Tests:    If your physician has ordered radiology tests such as MRI or CT scans, please contact Central Scheduling at [phone] right away to schedule the test.  Once scheduled, the [facility] Centralized Referral Team will work with your insurance carrier to obtain pre-certification or prior authorization.  Depending on your insurance carrier, approval may take 3-10 days.  It is highly recommended patients assure they have received an authorization before having a test performed.  If test is done without insurance authorization, patient may be responsible for the entire amount billed.      Precertification and Prior Authorizations:  If your physician has recommended that you have a procedure or additional testing performed the [facility]  Centralized Referral Team will contact your insurance carrier to obtain pre-certification or prior authorization.    You are strongly encouraged to contact your insurance carrier to verify that your procedure/test has been approved and is a COVERED benefit.  Although the [facility] Centralized Referral Team does its due diligence, the insurance carrier gives the disclaimer that \"Although the procedure is authorized, this does not guarantee payment.\"    Ultimately the patient is responsible for payment.   Thank you for your understanding in this matter.  Paperwork Completion:  If you require FMLA or disability paperwork for your recovery, please make sure to either drop it off or have it faxed to our office at [phone]. Be sure the form has your name and date of birth on it.  The form will be faxed to our Forms Department and they will complete it for you.  There is a 25$ fee for all forms that need to be filled out.  Please be aware there is a 10-14 day turnaround time.  You will need to sign a release of information (TERE) form if your paperwork does not come with one.  You may call the Forms Department with any questions at [phone].  Their fax number is [phone].

## 2024-01-18 NOTE — TELEPHONE ENCOUNTER
Patient presented to office with MRI disk during appointment from [facility].     Uploaded the following studies with [email or web address] reader    DOS: 11/10/22  MRI Brain w/w.o  MRA Brain  MRA Neck w/w.o    Disk returned to patient during appointment

## 2024-01-25 ENCOUNTER — APPOINTMENT (OUTPATIENT)
Dept: PEDIATRIC CARDIOLOGY | Age: 44
End: 2024-01-25

## 2024-01-25 ENCOUNTER — ANCILLARY PROCEDURE (OUTPATIENT)
Dept: PEDIATRIC CARDIOLOGY | Age: 44
End: 2024-01-25
Attending: PEDIATRICS

## 2024-01-25 VITALS
SYSTOLIC BLOOD PRESSURE: 121 MMHG | WEIGHT: 182.98 LBS | HEIGHT: 67 IN | DIASTOLIC BLOOD PRESSURE: 74 MMHG | BODY MASS INDEX: 28.72 KG/M2 | HEART RATE: 77 BPM | OXYGEN SATURATION: 99 %

## 2024-01-25 DIAGNOSIS — I63.9 CEREBROVASCULAR ACCIDENT (CVA), UNSPECIFIED MECHANISM (CMD): Primary | ICD-10-CM

## 2024-01-25 DIAGNOSIS — Z87.74 STATUS POST DEVICE CLOSURE OF ASD: ICD-10-CM

## 2024-01-25 DIAGNOSIS — Q21.12 PFO (PATENT FORAMEN OVALE): ICD-10-CM

## 2024-01-25 LAB
AORTIC ROOT: 3.51 CM (ref 2.42–3.43)
AORTIC VALVE ANNULUS: 2.2 CM (ref 1.71–2.49)
BSA FOR PED ECHO PROCEDURE: 2.01 M2
FRACTIONAL SHORTENING MMODE: 27 %
IVSS (M-MODE): 0.75 CM
LEFT VENTRICULAR POSTERIOR WALL IN END DIASTOLE MMODE: 0.72 CM (ref 0.52–0.99)
LEFT VENTRICULAR POSTERIOR WALL SYSTOLE MMODE: 1.47 CM
LV END-DIASTOLIC ENDOCARDIAL DIAMETER MMODE: 4.73 CM (ref 4.43–6.22)
LV END-DIASTOLIC SEPTAL THICKNESS MMODE: 0.72 CM (ref 0.53–1)
LVIDS BY MMODE: 3.44 CM
RIGHT VENTRICULAR END DIASTOLIC DIAS: 1.79 CM
SINOTUBULAR JUNCTION: 2.94 CM (ref 1.95–2.84)
Z SCORE OF AORTIC VALVE ANNULUS PHN: 0.5 CM
Z SCORE OF LEFT VENTRICULAR POSTERIOR WALL IN END DIASTOLE MMODE: -0.3 CM
Z SCORE OF LV END-DIASTOLIC ENDOCARDIAL DIAMETER MMODE: -1.3 CM
Z SCORE OF LV END-DIASTOLIC SEPTAL THICKNESS MMODE: -0.4 CM
Z-SCORE OF AORTIC ROOT: 2.3 CM
Z-SCORE OF SINOTUBULAR JUNCTION PHN: 2.4 CM

## 2024-01-25 PROCEDURE — 93303 ECHO TRANSTHORACIC: CPT | Performed by: PEDIATRICS

## 2024-01-25 PROCEDURE — 93325 DOPPLER ECHO COLOR FLOW MAPG: CPT | Performed by: PEDIATRICS

## 2024-01-25 PROCEDURE — 93320 DOPPLER ECHO COMPLETE: CPT | Performed by: PEDIATRICS

## 2024-01-25 ASSESSMENT — PAIN SCALES - GENERAL: PAINLEVEL: 0

## 2024-02-05 ENCOUNTER — LABORATORY ENCOUNTER (OUTPATIENT)
Dept: LAB | Age: 44
End: 2024-02-05
Attending: PHYSICIAN ASSISTANT
Payer: COMMERCIAL

## 2024-02-05 DIAGNOSIS — G35 MULTIPLE SCLEROSIS (HCC): Primary | ICD-10-CM

## 2024-02-05 DIAGNOSIS — G35 MS (MULTIPLE SCLEROSIS) (HCC): ICD-10-CM

## 2024-02-05 LAB — TSI SER-ACNC: 0.6 MIU/ML (ref 0.36–3.74)

## 2024-02-05 PROCEDURE — 36415 COLL VENOUS BLD VENIPUNCTURE: CPT

## 2024-02-05 PROCEDURE — 84443 ASSAY THYROID STIM HORMONE: CPT

## 2024-02-05 PROCEDURE — 86362 MOG-IGG1 ANTB CBA EACH: CPT

## 2024-02-05 PROCEDURE — 86051 AQUAPORIN-4 ANTB ELISA: CPT

## 2024-02-07 LAB
MOG ANTIBODY, CELL-BASED IFA: NEGATIVE
NMO IGG AUTOABS: <1.5 U/ML

## 2024-02-25 ENCOUNTER — HOSPITAL ENCOUNTER (OUTPATIENT)
Age: 44
Discharge: HOME | End: 2024-02-25
Payer: COMMERCIAL

## 2024-02-25 VITALS
OXYGEN SATURATION: 99 % | HEIGHT: 68 IN | WEIGHT: 185 LBS | BODY MASS INDEX: 28.04 KG/M2 | DIASTOLIC BLOOD PRESSURE: 82 MMHG | HEART RATE: 81 BPM | RESPIRATION RATE: 18 BRPM | SYSTOLIC BLOOD PRESSURE: 129 MMHG | TEMPERATURE: 98 F

## 2024-02-25 DIAGNOSIS — J02.9 VIRAL PHARYNGITIS: Primary | ICD-10-CM

## 2024-02-25 DIAGNOSIS — J02.9 SORE THROAT: ICD-10-CM

## 2024-02-25 LAB
S PYO AG THROAT QL: NEGATIVE
SARS-COV-2 RNA RESP QL NAA+PROBE: NOT DETECTED

## 2024-02-25 NOTE — ED PROVIDER NOTES
Patient Seen in: [facility]      History     Chief Complaint   Patient presents with    Sore Throat     Stated Complaint: Sore Throat    Subjective:   The history is provided by the patient.       44-year-old male with past med history of MS presents to the urgent care due to sore throat for the past 2 days.  Throat was mild no trismus drooling or muffled voice.  Mild nasal congestion.  No fevers chest pain cough or shortness of breath.  His daughter was strep positive few weeks ago with strep for which she completed the antibiotic.  Due to the recent exposure patient wanted to be tested for strep.  Denies any concern or desire to be tested for COVID or influenza.  No medications taken at home    Objective:   Past Medical History:   Diagnosis Date    MS (multiple sclerosis) ([facility]) 12/2022    PFO (patent foramen ovale) ([facility])     Stroke ([facility]) 02/2014    10/2022    Visual impairment     contacts/glasses              Past Surgical History:   Procedure Laterality Date    LAPAROSCOPIC INGUINAL HERNIA REPAIR Bilateral 09/18/2023    OTHER SURGICAL HISTORY  01/01/2001    nasal passage surgery    PFO CLOSURE - EXTERNAL REFERRAL  12/19/2023    URINE FLOW MEASUREMENT  12/01/2009    normal                Social History     Socioeconomic History    Marital status:    Occupational History    Occupation:    Tobacco Use    Smoking status: Never    Smokeless tobacco: Never   Vaping Use    Vaping Use: Never used   Substance and Sexual Activity    Alcohol use: Not Currently     Comment: none for the last 10 months    Drug use: No   Other Topics Concern     Service No    Blood Transfusions No    Caffeine Concern Yes     Comment: 3 cups of coffee daily; energy drinks 2 cans week    Occupational Exposure No    Hobby Hazards No    Sleep Concern No    Stress Concern No    Weight Concern No    Special Diet No    Back Care Yes    Exercise Yes     Comment: walks dog 20 minute day    Bike Helmet  Yes    Seat Belt Yes    Self-Exams Yes              Review of Systems   Constitutional:  Negative for chills, fatigue and fever.   HENT:  Positive for sore throat. Negative for tinnitus, trouble swallowing and voice change.    Respiratory: Negative.     Cardiovascular: Negative.    Gastrointestinal: Negative.        Positive for stated complaint: Sore Throat  Other systems are as noted in HPI.  Constitutional and vital signs reviewed.      All other systems reviewed and negative except as noted above.    Physical Exam     ED Triage Vitals [02/25/24 0920]   /82   Pulse 81   Resp 18   Temp 98.1 °F (36.7 °C)   Temp src Temporal   SpO2 99 %   O2 Device None (Room air)       Current:/82   Pulse 81   Temp 98.1 °F (36.7 °C) (Temporal)   Resp 18   Ht 172.7 cm (5' 8\")   Wt 83.9 kg   SpO2 99%   BMI 28.13 kg/m²         Physical Exam  Vitals and nursing note reviewed.   Constitutional:       General: He is not in acute distress.     Appearance: He is well-developed. He is not toxic-appearing.   HENT:      Head: Normocephalic.      Right Ear: Tympanic membrane and ear canal normal.      Left Ear: Tympanic membrane and ear canal normal.      Nose: Congestion present.      Mouth/Throat:      Mouth: Mucous membranes are moist.      Pharynx: Uvula midline. No pharyngeal swelling, oropharyngeal exudate, posterior oropharyngeal erythema or uvula swelling.      Tonsils: No tonsillar exudate or tonsillar abscesses. 0 on the right. 0 on the left.      Comments: Mild PND  Eyes:      Conjunctiva/sclera: Conjunctivae normal.   Cardiovascular:      Rate and Rhythm: Normal rate and regular rhythm.   Pulmonary:      Effort: Pulmonary effort is normal.      Breath sounds: Normal breath sounds.   Musculoskeletal:      Cervical back: Normal range of motion.   Lymphadenopathy:      Cervical: No cervical adenopathy.   Skin:     General: Skin is warm.   Neurological:      General: No focal deficit present.      Mental Status: He  is alert and oriented to person, place, and time.   Psychiatric:         Mood and Affect: Mood normal.         Behavior: Behavior normal.               ED Course     Labs Reviewed   POCT RAPID STREP - Normal   RAPID SARS-COV-2 BY PCR - Normal                      MDM     Differential diagnosis -strep pharyngitis, COVID, influenza, viral pharyngitis, peritonsillar abscess      On exam patient is afebrile nontoxic.  Vital signs are stable.  Tolerating his own secretion.  Mild nasal congestion.  Posterior pharynx without erythema.  No tonsillar edema.  Mild postnasal drip is present.  No concern for peritonsillar abscess.  No cervical lymphadenopathy.  Rest exam is unremarkable.  Rapid strep is negative.  Exam is consistent with a viral pharyngitis.  Discussed conservative treatment of antihistamines ibuprofen and Tylenol.  Patient is in agreement this treatment plan.  Strict return precautions were discussed at length                           Medical Decision Making  Problems Addressed:  Viral pharyngitis: acute illness or injury    Amount and/or Complexity of Data Reviewed  Labs: ordered. Decision-making details documented in ED Course.    Risk  OTC drugs.        Disposition and Plan     Clinical Impression:  1. Sore throat    2. Viral pharyngitis         Disposition:  Discharge  2/25/2024  9:45 am    Follow-up:  Hero Linn MD  [address] [facility]  [address] [place] [address]  [phone]                Medications Prescribed:  Discharge Medication List as of 2/25/2024  9:49 AM

## 2024-02-25 NOTE — DISCHARGE INSTRUCTIONS
You can use over-the-counter Cepacol lozenges as needed for symptom management.  Tylenol or ibuprofen as needed for fever or pain.  Try salt water gargles and warm tea with honey.    Follow with your primary care provider.    If you have any new, changing or worsening symptoms return for reevaluation or go to the ER

## 2024-03-01 ENCOUNTER — HOSPITAL ENCOUNTER (OUTPATIENT)
Age: 44
Discharge: HOME HEALTH | End: 2024-03-01
Payer: COMMERCIAL

## 2024-04-16 RX ORDER — CLOPIDOGREL BISULFATE 75 MG/1
75 TABLET ORAL DAILY
Qty: 90 TABLET | Refills: 1 | Status: SHIPPED | OUTPATIENT
Start: 2024-04-16

## 2024-04-16 RX ORDER — ASPIRIN 81 MG/1
81 TABLET, CHEWABLE ORAL DAILY
Qty: 90 TABLET | Refills: 1 | Status: SHIPPED | OUTPATIENT
Start: 2024-04-16

## 2024-05-10 RX ORDER — GLATIRAMER 40 MG/ML
40 INJECTION, SOLUTION SUBCUTANEOUS WEEKLY
Qty: 12 EACH | Refills: 2 | Status: SHIPPED | OUTPATIENT
Start: 2024-05-10 | End: 2024-05-13

## 2024-05-10 NOTE — TELEPHONE ENCOUNTER
Patient calling, advised needs refill on Copaxone. We have not filled this yet as patient was getting filled by previous provider.     Please advise refill and send through:      [facility] [place] [phone], [phone]

## 2024-05-13 RX ORDER — GLATIRAMER 40 MG/ML
40 INJECTION, SOLUTION SUBCUTANEOUS
Qty: 12 EACH | Refills: 2 | Status: SHIPPED | OUTPATIENT
Start: 2024-05-13

## 2024-06-18 ENCOUNTER — OFFICE VISIT (OUTPATIENT)
Dept: NEUROLOGY | Facility: CLINIC | Age: 44
End: 2024-06-18

## 2024-06-18 VITALS
HEART RATE: 74 BPM | SYSTOLIC BLOOD PRESSURE: 100 MMHG | DIASTOLIC BLOOD PRESSURE: 64 MMHG | WEIGHT: 185 LBS | HEIGHT: 68 IN | RESPIRATION RATE: 16 BRPM | BODY MASS INDEX: 28.04 KG/M2

## 2024-06-18 DIAGNOSIS — Z86.69 HX OF OPTIC NEURITIS: ICD-10-CM

## 2024-06-18 DIAGNOSIS — G35 MULTIPLE SCLEROSIS (HCC): Primary | ICD-10-CM

## 2024-06-18 DIAGNOSIS — Q21.12 PFO (PATENT FORAMEN OVALE) (HCC): ICD-10-CM

## 2024-06-18 DIAGNOSIS — G35 MS (MULTIPLE SCLEROSIS) (HCC): ICD-10-CM

## 2024-06-18 PROCEDURE — 99214 OFFICE O/P EST MOD 30 MIN: CPT | Performed by: OTHER

## 2024-06-18 NOTE — PATIENT INSTRUCTIONS
Refill policies:    Allow 2-3 business days for refills; controlled substances may take longer.  Contact your pharmacy at least 5 days prior to running out of medication and have them send an electronic request or submit request through the “request refill” option in your [employer] account.  Refills are not addressed on weekends; covering physicians do not authorize routine medications on weekends.  No narcotics or controlled substances are refilled after noon on Fridays or by on call physicians.  By law, narcotics must be electronically prescribed.  A 30 day supply with no refills is the maximum allowed.  If your prescription is due for a refill, you may be due for a follow up appointment.  To best provide you care, patients receiving routine medications need to be seen at least once a year.  Patients receiving narcotic/controlled substance medications need to be seen at least once every 3 months.  In the event that your preferred pharmacy does not have the requested medication in stock (e.g. Backordered), it is your responsibility to find another pharmacy that has the requested medication available.  We will gladly send a new prescription to that pharmacy at your request.    Scheduling Tests:    If your physician has ordered radiology tests such as MRI or CT scans, please contact Central Scheduling at [phone] right away to schedule the test.  Once scheduled, the [facility] Centralized Referral Team will work with your insurance carrier to obtain pre-certification or prior authorization.  Depending on your insurance carrier, approval may take 3-10 days.  It is highly recommended patients assure they have received an authorization before having a test performed.  If test is done without insurance authorization, patient may be responsible for the entire amount billed.      Precertification and Prior Authorizations:  If your physician has recommended that you have a procedure or additional testing performed the [facility]  Centralized Referral Team will contact your insurance carrier to obtain pre-certification or prior authorization.    You are strongly encouraged to contact your insurance carrier to verify that your procedure/test has been approved and is a COVERED benefit.  Although the [facility] Centralized Referral Team does its due diligence, the insurance carrier gives the disclaimer that \"Although the procedure is authorized, this does not guarantee payment.\"    Ultimately the patient is responsible for payment.   Thank you for your understanding in this matter.  Paperwork Completion:  If you require FMLA or disability paperwork for your recovery, please make sure to either drop it off or have it faxed to our office at [phone]. Be sure the form has your name and date of birth on it.  The form will be faxed to our Forms Department and they will complete it for you.  There is a 25$ fee for all forms that need to be filled out.  Please be aware there is a 10-14 day turnaround time.  You will need to sign a release of information (TERE) form if your paperwork does not come with one.  You may call the Forms Department with any questions at [phone].  Their fax number is [phone].

## 2024-06-18 NOTE — PROGRESS NOTES
NEUROLOGY  [facility]       Juan Peters  1/9/1980  Primary Care Provider:  Hero Linn MD    6/18/2024  44 year old yo,  was last seen on:: Jan 2024    Seen for/plans last visit:  Problem/s Identified this visit:   1. MS (multiple sclerosis) (HCC)    2. Hx of optic neuritis on the left    3. CSF oligoclonal bands    4. Complaints of memory disturbance    5. Hx of PFO closure        Previous visit and existing record notes reviewed in preparation for the face to face visit.  Relevant labs and studies reviewed and will be noted in relevant areas of this record.  Accompanied visit:     (x) No.      Present condition:  No symptoms  No SE from [place]    Past History update/new problem(s): as above    Review of Systems:  Review of Systems:  Denies systemic symptoms     No CP or SOB.  No GI or  symptoms. Relevant Neuro as noted above.      Medications:      Current Outpatient Medications:     Glatiramer Acetate (COPAXONE) 40 MG/ML Subcutaneous Solution Prefilled Syringe, Inject 40 mg into the skin 3 (three) times a week. Monday, Wednesday, Friday, Disp: 12 each, Rfl: 2    clopidogrel 75 MG Oral Tab, Take 1 tablet (75 mg total) by mouth daily., Disp: , Rfl:     aspirin 81 MG Oral Chew Tab, Chew 1 tablet (81 mg total) by mouth daily., Disp: , Rfl:     Loratadine 10 MG Oral Tablet Dispersible, Take 1 tablet (10 mg total) by mouth daily., Disp: , Rfl:     finasteride 1 MG Oral Tab, Take 1 tablet (1 mg total) by mouth daily., Disp: , Rfl:   PRN:     Allergies:  No Known Allergies       EXAM:  /64 (BP Location: Left arm, Patient Position: Sitting, Cuff Size: adult)   Pulse 74   Resp 16   Ht 68\"   Wt 185 lb (83.9 kg)   BMI 28.13 kg/m²   Looks stated age  General Exam:  HENT:  pink conjunctiva anicteric sclerae  Neck no adenopathy, thyroid normal  Heart and Lungs:  normal  Extremities: no cyanosis, skin changes    NEURO  NEURO  Able to relate events with fluent speech and intact  comprehension  CN 2-12: pupils reactive, VF full face symmetric sensation and movement tongue midline  No motor focal findings  Sensory: no lateralizing findings  Reflexes are symmetric  UMN signs: none  Gait: narrow based          INTERPRETATION of RELEVANT LABS and other DATA:    June 2023  PETERS  Cord no lesions  Brain mostly        Problem/s Identified this visit:   1. Multiple sclerosis (HCC)          Discussion plus Diagnostics & Treatment Orders:  Stable clinically  Need to establish MRI stability        Procedures    z Insight MRI BRAIN (W+WO) (CPT=70553)       Make sure [facility] MRI in June 2023 are on file for comparison    (x) Discussed potential side effects of any treatment relevant to above.  Includes explanation of tests as necessary.    Return in about 8 months (around 2/18/2025).      Patient understands that if needed, based on condition and or test results, follow up will be readjusted      Efren Aviles MD  Vascular & General Neurology  Director, Multiple Sclerosis Program  [facility]  6/18/2024, Time completed 10:28 AM    Decision making:  ( x ) labs reviewed/ordered - 1  (  ) new diagnosis: - 1  ( x) Images & studies independently reviewed -non F2F  (  ) Case/studies discussed with other caregivers - -non F2F  (  ) Telephone time with patiern or authorized Fam member--non F2F  ( x ) other records reviewed --non F2F including consultations  (  ) [facility] meetings - patient not present --non F2F  (  ) Independent Historian obtained    Non Face to Face CPT code 72799/79825 applies as documented above    PROCEDURE DONE     (   ) see notes        After visit, patient was escorted out and handed-off discharge process and instructions to the check out desk.  No additional issues relevant to visit were raised to staff at this time interval.        This document is to be interpreted as my current opinion regarding the case as of the stated date of service based on the information  available to me at this time and may supersedes any prior opinion expressed either orally or in writing.  Services rendered are only within the scope of direct medical care  Sometimes, reports may have been prepared partially using a speech recognition software technology.  If a word or phrase is confusing or out of context, please do not hesitate to call for clarification.

## 2024-08-09 ENCOUNTER — TELEPHONE (OUTPATIENT)
Dept: PEDIATRIC CARDIOLOGY | Age: 44
End: 2024-08-09

## 2024-09-20 NOTE — TELEPHONE ENCOUNTER
Medication: Glatiramer 40 mg     Date of last refill:05/13/2024 with 2 addt refills  [place]      Last office visit: 06/18/2024  Due back to clinic per last office note:    Date next office visit scheduled:    No future appointments.        Last OV note recommendation:    Discussion plus Diagnostics & Treatment Orders:  Stable clinically  Need to establish MRI stability             Procedures    z Insight MRI BRAIN (W+WO) (CPT=70553)         Make sure [facility] MRI in June 2023 are on file for comparison     (x) Discussed potential side effects of any treatment relevant to above.  Includes explanation of tests as necessary.     Return in about 8 months (around 2/18/2025).

## 2024-09-22 RX ORDER — GLATIRAMER 40 MG/ML
40 INJECTION, SOLUTION SUBCUTANEOUS
Qty: 12 EACH | Refills: 6 | Status: SHIPPED | OUTPATIENT
Start: 2024-09-23

## 2024-10-21 RX ORDER — ASPIRIN 81 MG/1
81 TABLET, CHEWABLE ORAL DAILY
Qty: 90 TABLET | Refills: 1 | OUTPATIENT
Start: 2024-10-21

## 2024-12-30 ENCOUNTER — HOSPITAL ENCOUNTER (OUTPATIENT)
Age: 44
Discharge: HOME | End: 2024-12-30
Payer: COMMERCIAL

## 2024-12-30 ENCOUNTER — APPOINTMENT (OUTPATIENT)
Dept: GENERAL RADIOLOGY | Age: 44
End: 2024-12-30
Attending: NURSE PRACTITIONER
Payer: COMMERCIAL

## 2024-12-30 VITALS
TEMPERATURE: 98 F | HEART RATE: 75 BPM | RESPIRATION RATE: 18 BRPM | SYSTOLIC BLOOD PRESSURE: 124 MMHG | DIASTOLIC BLOOD PRESSURE: 87 MMHG | OXYGEN SATURATION: 99 %

## 2024-12-30 DIAGNOSIS — R05.8 POST-VIRAL COUGH SYNDROME: Primary | ICD-10-CM

## 2024-12-30 DIAGNOSIS — R05.1 ACUTE COUGH: ICD-10-CM

## 2024-12-30 PROBLEM — E83.19 IRON EXCESS: Status: ACTIVE | Noted: 2022-11-08

## 2024-12-30 PROBLEM — F10.10 ALCOHOL ABUSE: Status: ACTIVE | Noted: 2022-11-08

## 2024-12-30 PROBLEM — H34.232 RETINAL ARTERY OCCLUSION, BRANCH, LEFT: Status: ACTIVE | Noted: 2022-11-08

## 2024-12-30 PROBLEM — G81.14 LEFT SPASTIC HEMIPLEGIA (HCC): Status: ACTIVE | Noted: 2023-02-08

## 2024-12-30 PROBLEM — I63.412 CEREBRAL INFARCTION DUE TO EMBOLISM OF LEFT MIDDLE CEREBRAL ARTERY (HCC): Status: ACTIVE | Noted: 2022-11-08

## 2024-12-30 PROBLEM — I48.20 CHRONIC ATRIAL FIBRILLATION (HCC): Status: ACTIVE | Noted: 2022-11-08

## 2024-12-30 PROBLEM — H46.9 OPTIC NEURITIS DUE TO MULTIPLE SCLEROSIS (HCC): Status: ACTIVE | Noted: 2022-11-21

## 2024-12-30 PROBLEM — G35 MULTIPLE SCLEROSIS (HCC): Status: ACTIVE | Noted: 2024-01-19

## 2024-12-30 PROBLEM — G37.9 DEMYELINATING DISEASE (HCC): Status: ACTIVE | Noted: 2022-11-14

## 2024-12-30 PROBLEM — I63.541: Status: ACTIVE | Noted: 2023-02-08

## 2024-12-30 PROBLEM — H53.8 BLURRED VISION, LEFT EYE: Status: ACTIVE | Noted: 2022-11-15

## 2024-12-30 PROBLEM — G35 OPTIC NEURITIS DUE TO MULTIPLE SCLEROSIS (HCC): Status: ACTIVE | Noted: 2022-11-21

## 2024-12-30 LAB — S PYO AG THROAT QL: NEGATIVE

## 2024-12-30 PROCEDURE — 99214 OFFICE O/P EST MOD 30 MIN: CPT | Performed by: NURSE PRACTITIONER

## 2024-12-30 PROCEDURE — 71046 X-RAY EXAM CHEST 2 VIEWS: CPT | Performed by: NURSE PRACTITIONER

## 2024-12-30 PROCEDURE — 87880 STREP A ASSAY W/OPTIC: CPT | Performed by: NURSE PRACTITIONER

## 2024-12-30 RX ORDER — BENZONATATE 100 MG/1
100 CAPSULE ORAL 3 TIMES DAILY PRN
Qty: 30 CAPSULE | Refills: 0 | Status: SHIPPED | OUTPATIENT
Start: 2024-12-30 | End: 2025-01-29

## 2024-12-30 RX ORDER — FLUTICASONE PROPIONATE 50 MCG
2 SPRAY, SUSPENSION (ML) NASAL DAILY
Qty: 16 G | Refills: 0 | Status: SHIPPED | OUTPATIENT
Start: 2024-12-30 | End: 2025-01-29

## 2024-12-30 NOTE — ED INITIAL ASSESSMENT (HPI)
Pt with cough sorethroat for the last 2 weeks. Denies fevers. Pt concerned about pneumonia, son was recently dx with pneumonia

## 2024-12-30 NOTE — DISCHARGE INSTRUCTIONS
Chest xray does not reveal any pneumonia.   Strep test is negative.     We recommend the following for your condition:    Claritin D once a day  Flonase 2 sprays to each side of nose once a day - con't for 2-4 weeks  Nasal saline - either spray (\"Ocean\" brand) or Rodney Med Sinus Rinse 3 times a day  Tessalon: take 1 tab up to three times a day IF NEEDED for coughing.   Rest and push fluids  Hot lemon tea with honey  Steam twice a day (either in shower or with cup of hot water and a towel over your head)   Follow up with PCP if not improving in the next week.

## 2024-12-30 NOTE — ED PROVIDER NOTES
Patient Seen in: [facility]      History     Chief Complaint   Patient presents with    Cough/URI    Sore Throat     Stated Complaint: cough; green phlegm; sore throat; pain in chest    Subjective:   45 yo male presents with cough that began about 2 weeks ago. Symptoms started with sore throat and congestion which has mostly improved. Son had pneumonia.  OTC's tried.     Pt denies fever, chills, shortness of breath, chest pain, nausea, vomiting or diarrhea.     The history is provided by the patient.         Objective:     Past Medical History:    MS (multiple sclerosis) ([facility])    PFO (patent foramen ovale) ([facility])    Stroke ([facility])    10/2022    Visual impairment    contacts/glasses              Past Surgical History:   Procedure Laterality Date    Laparoscopic inguinal hernia repair Bilateral 09/18/2023    Other surgical history  01/01/2001    nasal passage surgery    Pfo closure - external referral  12/19/2023    Urine flow measurement  12/01/2009    normal                Social History     Socioeconomic History    Marital status:    Occupational History    Occupation:    Tobacco Use    Smoking status: Never    Smokeless tobacco: Never   Vaping Use    Vaping status: Never Used   Substance and Sexual Activity    Alcohol use: Not Currently     Comment: none for the last 10 months    Drug use: No   Other Topics Concern     Service No    Blood Transfusions No    Caffeine Concern Yes     Comment: 3 cups of coffee daily; energy drinks 2 cans week    Occupational Exposure No    Hobby Hazards No    Sleep Concern No    Stress Concern No    Weight Concern No    Special Diet No    Back Care Yes    Exercise Yes     Comment: walks dog 20 minute day    Bike Helmet Yes    Seat Belt Yes    Self-Exams Yes     Social Drivers of Health     Financial Resource Strain: Low Risk  (6/14/2023)    [facility]    Overall Financial Resource Strain (CARDIA)     Difficulty  of Paying Living Expenses: Not hard at all   Food Insecurity: Low Risk  (12/19/2023)    [facility]    Food Insecurity     Within the past 12 months, you worried that your food would run out before you got money to buy more.  : Never true     Within the past 12 months, the food you bought just didn't last and you didn't have money to get more. : Never true   Transportation Needs: No Transportation Needs (6/14/2023)    Received from [facility]    PRAPARE - Transportation     Lack of Transportation (Medical): No     Lack of Transportation (Non-Medical): No   Physical Activity: Insufficiently Active (6/14/2023)    Received from [facility]    Exercise Vital Sign     Days of Exercise per Week: 4 days     Minutes of Exercise per Session: 20 min   Social Connections: Low Risk  (12/19/2023)    Received from [facility], [facility]    Social Connections     How often do you see or talk to people that you care about and feel close to? (For example: talking to friends on the phone, visiting friends or family, going to [employer] or club meetings): 5 or more times a week   Housing Stability: Low Risk  (6/14/2023)    Received from [facility]    Housing Stability     What is your living situation today?: I have a steady place to live              Review of Systems   Constitutional:  Negative for chills and fever.   HENT:  Positive for postnasal drip and rhinorrhea. Negative for congestion, sinus pressure and trouble swallowing.    Respiratory:  Positive for cough. Negative for chest tightness, shortness of breath and wheezing.    Cardiovascular:  Negative for chest pain.   Gastrointestinal:  Negative for abdominal pain, nausea and vomiting.       Positive for stated complaint: cough; green phlegm; sore throat; pain in chest  Other systems are as noted in HPI.  Constitutional and vital signs reviewed.      All other  systems reviewed and negative except as noted above.    Physical Exam     ED Triage Vitals [12/30/24 0852]   /87   Pulse 75   Resp 18   Temp 97.8 °F (36.6 °C)   Temp src Oral   SpO2 99 %   O2 Device None (Room air)       Current Vitals:   No data recorded      Physical Exam  Vitals and nursing note reviewed.   Constitutional:       General: He is not in acute distress.     Appearance: Normal appearance. He is not ill-appearing, toxic-appearing or diaphoretic.   HENT:      Head: Normocephalic.      Right Ear: Tympanic membrane normal.      Left Ear: Tympanic membrane normal.      Nose: Rhinorrhea present.      Mouth/Throat:      Mouth: Mucous membranes are moist.      Pharynx: Oropharynx is clear. Uvula midline. Posterior oropharyngeal erythema (mild) present. No oropharyngeal exudate.   Eyes:      General: No scleral icterus.     Conjunctiva/sclera: Conjunctivae normal.   Cardiovascular:      Rate and Rhythm: Normal rate and regular rhythm.      Heart sounds: No murmur heard.  Pulmonary:      Effort: Pulmonary effort is normal. No respiratory distress.      Breath sounds: No stridor. No wheezing, rhonchi or rales.   Abdominal:      General: Abdomen is flat. Bowel sounds are normal.      Palpations: Abdomen is soft.      Tenderness: There is no abdominal tenderness.   Musculoskeletal:         General: Normal range of motion.      Cervical back: Normal range of motion and neck supple.   Lymphadenopathy:      Cervical: No cervical adenopathy.   Skin:     General: Skin is warm and dry.      Findings: No rash.   Neurological:      Mental Status: He is alert.   Psychiatric:         Mood and Affect: Mood normal.             ED Course     Labs Reviewed   POCT RAPID STREP - Normal   GRP A STREP CULT, THROAT        [facility]    Medical Decision Making  43 yo male presents with cough that began about 2 weeks ago. Symptoms started with sore throat and congestion which has improved.   Diff dx includes bronchitis, post viral  cough, pneumonia.   On exam, pt is well appearing with normal vitals, no sinus pressure or tenderness..   Strep negative. Declined viral testing.   CXR without acute findings.   Likely post viral cough syndrome; will treat with Flonase, Tessalon, steam, push fluids.   PCP follow up if not improving as anticipated.   Pt agreeable to plan.       Amount and/or Complexity of Data Reviewed  Labs: ordered.  Radiology: ordered.    Risk  OTC drugs.  Prescription drug management.        Disposition and Plan     Clinical Impression:  1. Post-viral cough syndrome    2. Acute cough         Disposition:  Discharge  12/30/2024  9:47 am    Follow-up:  Hero Linn MD  [address] [facility]  [address] [place] [address]  [phone]    In 1 week  If symptoms worsen          Medications Prescribed:  Discharge Medication List as of 12/30/2024  9:47 AM        START taking these medications    Details   benzonatate 100 MG Oral Cap Take 1 capsule (100 mg total) by mouth 3 (three) times daily as needed for cough., Normal, Disp-30 capsule, R-0      fluticasone propionate 50 MCG/ACT Nasal Suspension 2 sprays by Nasal route daily., Normal, Disp-16 g, R-0                 Supplementary Documentation:

## 2025-01-09 ENCOUNTER — TELEPHONE (OUTPATIENT)
Dept: NEUROLOGY | Facility: CLINIC | Age: 45
End: 2025-01-09

## 2025-01-10 NOTE — TELEPHONE ENCOUNTER
Spoke to patient regarding current DMT and efficacy.  On COPAXONE, last MRI's 7/2024.  Next OV 6/18/2025.    Patient currently on antibiotic and steroid for eye, is feeling better.  Did have fatigue and dizziness prior to this treatment,  Will call this office if symptoms return.

## 2025-01-16 ENCOUNTER — APPOINTMENT (OUTPATIENT)
Dept: GENERAL RADIOLOGY | Age: 45
End: 2025-01-16
Attending: EMERGENCY MEDICINE
Payer: COMMERCIAL

## 2025-01-16 ENCOUNTER — APPOINTMENT (OUTPATIENT)
Dept: GENERAL RADIOLOGY | Age: 45
End: 2025-01-16
Payer: COMMERCIAL

## 2025-01-16 ENCOUNTER — HOSPITAL ENCOUNTER (EMERGENCY)
Age: 45
Discharge: HOME | End: 2025-01-16
Attending: EMERGENCY MEDICINE
Payer: COMMERCIAL

## 2025-01-16 VITALS
TEMPERATURE: 98 F | HEIGHT: 68 IN | OXYGEN SATURATION: 96 % | WEIGHT: 181 LBS | SYSTOLIC BLOOD PRESSURE: 130 MMHG | BODY MASS INDEX: 27.43 KG/M2 | HEART RATE: 86 BPM | RESPIRATION RATE: 18 BRPM | DIASTOLIC BLOOD PRESSURE: 80 MMHG

## 2025-01-16 DIAGNOSIS — S92.001A CLOSED DISPLACED FRACTURE OF RIGHT CALCANEUS, UNSPECIFIED PORTION OF CALCANEUS, INITIAL ENCOUNTER: Primary | ICD-10-CM

## 2025-01-16 PROCEDURE — 73650 X-RAY EXAM OF HEEL: CPT | Performed by: EMERGENCY MEDICINE

## 2025-01-16 PROCEDURE — 28400 CLTX CALCANEAL FX W/O MNPJ: CPT

## 2025-01-16 PROCEDURE — 99284 EMERGENCY DEPT VISIT MOD MDM: CPT

## 2025-01-16 PROCEDURE — 73630 X-RAY EXAM OF FOOT: CPT

## 2025-01-16 RX ORDER — IBUPROFEN 600 MG/1
600 TABLET, FILM COATED ORAL ONCE
Status: COMPLETED | OUTPATIENT
Start: 2025-01-16 | End: 2025-01-16

## 2025-01-16 RX ORDER — HYDROCODONE BITARTRATE AND ACETAMINOPHEN 5; 325 MG/1; MG/1
1 TABLET ORAL EVERY 4 HOURS PRN
Qty: 12 TABLET | Refills: 0 | Status: SHIPPED | OUTPATIENT
Start: 2025-01-16

## 2025-01-16 RX ORDER — CEFUROXIME AXETIL 500 MG/1
500 TABLET ORAL 2 TIMES DAILY
COMMUNITY
Start: 2025-01-08 | End: 2025-01-18

## 2025-01-16 RX ORDER — PREDNISONE 20 MG/1
TABLET ORAL
COMMUNITY
Start: 2024-12-31

## 2025-01-16 NOTE — ED INITIAL ASSESSMENT (HPI)
Fell off ladder (about 6ft) while taking [employer] lights down- landed on right side. C/O right foot pain. Denies head injury. Takes baby ASA, no other thinners.

## 2025-01-17 ENCOUNTER — PATIENT MESSAGE (OUTPATIENT)
Dept: NEUROLOGY | Facility: CLINIC | Age: 45
End: 2025-01-17

## 2025-01-17 NOTE — ED PROVIDER NOTES
Patient Seen in: [facility] [place]      History     Chief Complaint   Patient presents with    Fall     Stated Complaint: Fell off of a ladder, injury to right foot    Subjective:   HPI      Patient fell off of a ladder landing on his heel unable to ambulate since.  He is taking now Shaylee lights.  Brace himself with his wrist and landed a bit on his bottom but no head trauma.  Denies any neck or back pain.  Denies pain at the hip.        Objective:     Past Medical History:    MS (multiple sclerosis) ([facility])    PFO (patent foramen ovale) ([facility])    Stroke ([facility])    10/2022    Visual impairment    contacts/glasses              Past Surgical History:   Procedure Laterality Date    Laparoscopic inguinal hernia repair Bilateral 09/18/2023    Other surgical history  01/01/2001    nasal passage surgery    Pfo closure - external referral  12/19/2023    Urine flow measurement  12/01/2009    normal                Social History     Socioeconomic History    Marital status:    Occupational History    Occupation:    Tobacco Use    Smoking status: Never    Smokeless tobacco: Never   Vaping Use    Vaping status: Never Used   Substance and Sexual Activity    Alcohol use: Not Currently     Comment: none for the last 10 months    Drug use: No   Other Topics Concern     Service No    Blood Transfusions No    Caffeine Concern Yes     Comment: 3 cups of coffee daily; energy drinks 2 cans week    Occupational Exposure No    Hobby Hazards No    Sleep Concern No    Stress Concern No    Weight Concern No    Special Diet No    Back Care Yes    Exercise Yes     Comment: walks dog 20 minute day    Bike Helmet Yes    Seat Belt Yes    Self-Exams Yes     Social Drivers of Health     Financial Resource Strain: Low Risk  (6/14/2023)    [facility]    Overall Financial Resource Strain (CARDIA)     Difficulty of Paying Living Expenses: Not hard at all   Food  Insecurity: Low Risk  (12/19/2023)    Received from [facility], [facility]    Food Insecurity     Within the past 12 months, you worried that your food would run out before you got money to buy more.  : Never true     Within the past 12 months, the food you bought just didn't last and you didn't have money to get more. : Never true   Transportation Needs: No Transportation Needs (6/14/2023)    Received from [facility]    PRAPARE - Transportation     Lack of Transportation (Medical): No     Lack of Transportation (Non-Medical): No   Physical Activity: Insufficiently Active (6/14/2023)    Received from [facility]    Exercise Vital Sign     Days of Exercise per Week: 4 days     Minutes of Exercise per Session: 20 min   Social Connections: Low Risk  (12/19/2023)    [facility]    Social Connections     How often do you see or talk to people that you care about and feel close to? (For example: talking to friends on the phone, visiting friends or family, going to [employer] or club meetings): 5 or more times a week   Housing Stability: Low Risk  (6/14/2023)    Received from [facility]    Housing Stability     What is your living situation today?: I have a steady place to live                  Physical Exam     ED Triage Vitals [01/16/25 1509]   /73   Pulse 87   Resp 18   Temp 98.4 °F (36.9 °C)   Temp src    SpO2 98 %   O2 Device None (Room air)       Current Vitals:   Vital Signs  BP: 130/80  Pulse: 86  Resp: 18  Temp: 98.4 °F (36.9 °C)    Oxygen Therapy  SpO2: 96 %  O2 Device: None (Room air)        Physical Exam      Physical Exam   Constitutional: Awake, alert, no distress.  Head: Normocephalic and atraumatic.   Eyes: Conjunctivae are normal.  Pupils equal, round.  Neck: Normal range of motion. Neck supple.   Cardiovascular: Extremities appear well perfused, no cyanosis.  Pulmonary/Chest: Normal effort.   No accessory muscle use.  No clubbing, no cyanosis.  Abdominal: Not distended.  Neurological: Pt is alert and oriented to person, place, and time.  Moves all 4 extremities appropriately.  Skin: Skin is warm and dry.    Exam of the right hand and wrist is unremarkable with no bony tenderness no pain with active loading the thumb no snuffbox tenderness, no swelling abrasion or ecchymosis.      ED patient is tender with palpation and squeezing of the calcaneus but otherwise there is no bony tenderness of the bones of the foot lateral, medial malleoli or no ecchymosis.    No pain to palpation of the cervical thoracic or lumbar spine  ED Course   Labs Reviewed - No data to display         XR HEEL (CALCANEUS) (MIN 2 VIEWS), RIGHT (CPT=73650)    Result Date: 1/16/2025  PROCEDURE:  XR HEEL (CALCANEUS) (MIN 2 VIEWS), RIGHT (CPT=73650)  TECHNIQUE:  Two views of the calcaneus were obtained.  COMPARISON:  [facility], XR, XR FOOT, COMPLETE (MIN 3 VIEWS), RIGHT (CPT=73630), 1/16/2025, 3:40 PM.  INDICATIONS:  PATIENT STATED HISTORY: (As transcribed by Technologist)  Severe right heel pain. Injured post fall off of ladder today.     FINDINGS:    Comminuted fracture through the body of the calcaneus is noted which is mildly displaced.  No evidence of dislocation.  Diffuse soft tissue swelling.             CONCLUSION:  Comminuted fracture through the calcaneus is noted.   LOCATION:  [facility]   Dictated by (CST): Gelacio Ybarra MD on 1/16/2025 at 5:12 PM     Finalized by (CST): Gelacio Ybarra MD on 1/16/2025 at 5:13 PM       XR FOOT, COMPLETE (MIN 3 VIEWS), RIGHT (CPT=73630)    Result Date: 1/16/2025  PROCEDURE:  XR FOOT, COMPLETE (MIN 3 VIEWS), RIGHT (CPT=73630)  TECHNIQUE:  AP, oblique, and lateral views were obtained.  COMPARISON:  None.  INDICATIONS:  Fell off of a ladder, injury to right foot  PATIENT STATED HISTORY: (As transcribed by Technologist)  Patient states to fall off of ladder and braced their fall on their right side and  landed on their wrist but denies any pain. Right foot pain since this morning.    FINDINGS:  No acute fracture or dislocation.  Mild degenerative changes are noted most pronounced in the distal interphalangeal joints.  There is mild lateral angulation of the distal phalanx in the right 2nd toe.  Soft tissues are within normal limits.            CONCLUSION:  No acute fracture or dislocation.  LOCATION:  [facility]   Dictated by (CST): Gelacio Ybarra MD on 1/16/2025 at 4:16 PM     Finalized by (CST): Gelacio Ybarra MD on 1/16/2025 at 4:17 PM       XR CHEST PA + LAT CHEST (CPT=71046)    Result Date: 12/30/2024  PROCEDURE:  XR CHEST PA + LAT CHEST (CPT=71046)  INDICATIONS:  cough; green phlegm; sore throat; pain in chest  COMPARISON:  [facility] , XR, XR CHEST AP PORTABLE  (CPT=71010), 2/20/2014, 6:00 PM.  TECHNIQUE:  PA and lateral chest radiographs were obtained.  PATIENT STATED HISTORY: (As transcribed by Technologist)  Patient states symptoms started 2 weeks ago with sore throat and congestion. Patient developed a cough 3 days ago. Patient's son just had pneumonia.    FINDINGS:  LUNGS:  No focal consolidation.  Normal vascularity. CARDIAC:  Normal size cardiac silhouette. MEDIASTINUM:  Normal. PLEURA:  Normal.  No pleural effusions. BONES:  Normal for age.            CONCLUSION:  Negative exam.   LOCATION:  [facility]   Dictated by ([facility]): Tatyana Sylvester DO on 12/30/2024 at 9:39 AM     Finalized by ([facility]): Tatyana Sylvester DO on 12/30/2024 at 9:39 AM          Patient was placed in a short leg postmold with a very bulky dressing at the heel.  I personally assisted in applying the splint.  This was done to stabilize the fracture of the right calcaneus.    The patient's splint was checked after placement and was noted to have good placement.  Distal circulation and neurovascular exam was noted to be intact.           MDM        Foot fracture, calcaneus fracture, axial load injury, foot contusion all considered      -Patient has a great  healing fracture as a result of an axial load injury.  No other axial load injuries noted.    -Nonweightbearing with crutches outpatient podiatry follow-up    *Norco prescribed for pain control      *I looked at the if that the x-ray, there is a displaced calcaneal fracture noted.    Medical Decision Making      Disposition and Plan     Clinical Impression:  1. Closed displaced fracture of right calcaneus, unspecified portion of calcaneus, initial encounter         Disposition:  Discharge  1/16/2025  5:42 pm    Follow-up:  Jayy Lund DPM  [address] [facility]  [address] FLO[address]  [facility] [address]  [phone]    Call in 1 day(s)            Medications Prescribed:  Discharge Medication List as of 1/16/2025  5:45 PM              Supplementary Documentation:

## 2025-01-20 NOTE — TELEPHONE ENCOUNTER
Spoke to patient who has not been cleared by PCP, but is not sure whether he will require anesthesia for surgery. He has reached out to PCP and will follow up with this office if he does need a surgical clearance letter.

## 2025-04-09 ENCOUNTER — OFFICE VISIT (OUTPATIENT)
Dept: SURGERY | Facility: CLINIC | Age: 45
End: 2025-04-09
Payer: COMMERCIAL

## 2025-04-09 VITALS
HEART RATE: 82 BPM | RESPIRATION RATE: 97 BRPM | SYSTOLIC BLOOD PRESSURE: 115 MMHG | TEMPERATURE: 97 F | DIASTOLIC BLOOD PRESSURE: 80 MMHG

## 2025-04-09 DIAGNOSIS — N43.3 HYDROCELE, UNSPECIFIED HYDROCELE TYPE: Primary | ICD-10-CM

## 2025-04-09 PROCEDURE — 3074F SYST BP LT 130 MM HG: CPT | Performed by: STUDENT IN AN ORGANIZED HEALTH CARE EDUCATION/TRAINING PROGRAM

## 2025-04-09 PROCEDURE — 3079F DIAST BP 80-89 MM HG: CPT | Performed by: STUDENT IN AN ORGANIZED HEALTH CARE EDUCATION/TRAINING PROGRAM

## 2025-04-09 PROCEDURE — 99214 OFFICE O/P EST MOD 30 MIN: CPT | Performed by: STUDENT IN AN ORGANIZED HEALTH CARE EDUCATION/TRAINING PROGRAM

## 2025-04-09 RX ORDER — KETOCONAZOLE 20 MG/G
CREAM TOPICAL
COMMUNITY
Start: 2025-04-08

## 2025-04-09 RX ORDER — SENNOSIDES 8.6 MG
1300 CAPSULE ORAL EVERY 8 HOURS
COMMUNITY

## 2025-04-09 RX ORDER — BUPROPION HYDROCHLORIDE 300 MG/1
1 TABLET ORAL DAILY
COMMUNITY
Start: 2024-10-28

## 2025-04-09 NOTE — H&P
New Patient Visit Note       Active Problems      1. Hydrocele, unspecified hydrocele type        Chief Complaint   Chief Complaint   Patient presents with    New Patient     NP- Fluid Buildup-  Laparoscopic Bilateral Inguinal Hernia Repair With Mesh 9/18/23 w/Dr. Singh- reports swelling in testicles        History of Present Illness   History of Present Illness  Christian Peters is a 45 year old male who presents with scrotal swelling.    Christian recalls this started following his hernia surgery in 2023. He noticed a bulge in the scrotum where the right testicle appearing larger and swollen. The swelling has increased significantly since having heel surgery in January 2025. It is inconvenient but not painful.      An ultrasound in May 2024, prior to the recent increase in swelling, showed a large sized septated hydrocele. Patient reports the condition has worsened following recent heel surgery. No associated pain, urinary symptoms, bloody urine, dysuria , shortness of breath, headaches, blurry vision, cough, or bone pain. He denies any groin pain or bulge.     Occasional night sweats are reported, but no fever.    Past medical history includes strokes at age 34 and in 2020, PFO closure, multiple sclerosis, and recent heel surgery after fall.             Allergies  Juan is allergic to tramadol.    Past Medical / Surgical / Social / Family History    The past medical and past surgical history have been reviewed by me today.    Past Medical History[1]  Past Surgical History[2]    The family history and social history have been reviewed by me today.    Family History[3]  Social Hx on file[4]   Medications - Current[5]      Review of Systems  The Review of Systems has been reviewed by me during today.  Review of Systems   Constitutional:  Negative for chills, diaphoresis, fatigue and fever.   HENT:  Negative for ear discharge, ear pain and sore throat.    Eyes:  Negative for pain and discharge.   Respiratory:   Negative for cough, chest tightness and shortness of breath.    Cardiovascular:  Negative for chest pain, palpitations and leg swelling.   Gastrointestinal:  Negative for abdominal distention, abdominal pain, blood in stool, constipation, diarrhea, nausea and vomiting.   Genitourinary:  Positive for scrotal swelling. Negative for dysuria, frequency, hematuria and urgency.   Skin:  Negative for color change, pallor and rash.   Neurological:  Negative for weakness, light-headedness, numbness and headaches.   Hematological:  Negative for adenopathy. Does not bruise/bleed easily.   Psychiatric/Behavioral:  Negative for agitation and confusion.        Physical Findings   /80   Pulse 82   Temp 97 °F (36.1 °C) (Temporal)   Resp (!) 97   Physical Exam  Constitutional:       Appearance: Normal appearance.   HENT:      Head: Normocephalic and atraumatic.   Cardiovascular:      Pulses: Normal pulses.   Pulmonary:      Effort: Pulmonary effort is normal.   Abdominal:      Hernia: There is no hernia in the left inguinal area or right inguinal area.   Genitourinary:     Pubic Area: No rash.       Testes:         Right: Swelling and testicular hydrocele present.   Lymphadenopathy:      Lower Body: No right inguinal adenopathy. No left inguinal adenopathy.   Skin:     General: Skin is warm.      Capillary Refill: Capillary refill takes less than 2 seconds.   Neurological:      Mental Status: He is alert and oriented to person, place, and time. Mental status is at baseline.             Assessment/Plan  1. Hydrocele, unspecified hydrocele type        Juan Peters is a 45 year old male who underwent laparoscopic bilateral inguinal hernia repair with dr. Singh in 2023.     Assessment & Plan  Hydrocele  Hydrocele with scrotal bulge and unilateral swelling, worsened post-heel surgery. Ultrasound in 2024 consistent with large hyderocele.   - will obtain a scrotal ultrasound to assess hydrocele and rule out other  conditions.  - Referred to urologist for evaluation and management.  - No signs of recurrent hernia at the site on exam and denies any symptoms of hernia           No orders of the defined types were placed in this encounter.      Imaging & Referrals   US SCROTUM W/ DOPPLER (CPT=93975/88702)    Follow Up  No follow-ups on file.    Adelina Malhotra MD    CC  , Hero Linn MD       [1]   Past Medical History:   MS (multiple sclerosis) ([facility])    PFO (patent foramen ovale) ([facility])    Stroke ([facility])    10/2022    Visual impairment    contacts/glasses   [2]   Past Surgical History:  Procedure Laterality Date    Laparoscopic inguinal hernia repair Bilateral 09/18/2023    Other surgical history  01/01/2001    nasal passage surgery    Pfo closure - external referral  12/19/2023    Urine flow measurement  12/01/2009    normal   [3]   Family History  Problem Relation Age of Onset    Diabetes Maternal Grandfather     Neurological Disorder Maternal Grandmother         dementia    Neurological Disorder Brother         tic   [4]   Social History  Socioeconomic History    Marital status:    Occupational History    Occupation:    Tobacco Use    Smoking status: Never    Smokeless tobacco: Never   Vaping Use    Vaping status: Never Used   Substance and Sexual Activity    Alcohol use: Not Currently     Comment: none for the last 10 months    Drug use: No   Other Topics Concern     Service No    Blood Transfusions No    Caffeine Concern Yes     Comment: 3 cups of coffee daily; energy drinks 2 cans week    Occupational Exposure No    Hobby Hazards No    Sleep Concern No    Stress Concern No    Weight Concern No    Special Diet No    Back Care Yes    Exercise Yes     Comment: walks dog 20 minute day    Bike Helmet Yes    Seat Belt Yes    Self-Exams Yes   [5]   Current Outpatient Medications:     buPROPion  MG Oral Tablet 24 Hr, Take 1 tablet (300 mg total) by mouth daily., Disp: , Rfl:     ketoconazole  2 % External Cream, , Disp: , Rfl:     Acetaminophen  MG Oral Tab CR, Take 2 tablets (1,300 mg total) by mouth every 8 (eight) hours., Disp: , Rfl:     predniSONE 20 MG Oral Tab, , Disp: , Rfl:     HYDROcodone-acetaminophen 5-325 MG Oral Tab, Take 1 tablet by mouth every 4 (four) hours as needed for Pain. (Patient not taking: Reported on 4/9/2025), Disp: 12 tablet, Rfl: 0    Glatiramer Acetate (COPAXONE) 40 MG/ML Subcutaneous Solution Prefilled Syringe, Inject 40 mg into the skin 3 (three) times a week. Monday, Wednesday, Friday, Disp: 12 each, Rfl: 6    aspirin 81 MG Oral Chew Tab, Chew 1 tablet (81 mg total) by mouth in the morning., Disp: , Rfl:     Loratadine 10 MG Oral Tablet Dispersible, Take 1 tablet (10 mg total) by mouth daily., Disp: , Rfl:     finasteride 1 MG Oral Tab, Take 1 tablet (1 mg total) by mouth daily., Disp: , Rfl:

## 2025-04-09 NOTE — H&P
The following individual(s) verbally consented to be recorded using ambient AI listening technology and understand that they can each withdraw their consent to this listening technology at any point by asking the clinician to turn off or pause the recording:    Patient name: Juan Peters  Additional names:  Malgorzata AVILA

## 2025-04-10 ENCOUNTER — HOSPITAL ENCOUNTER (OUTPATIENT)
Dept: ULTRASOUND IMAGING | Age: 45
Discharge: HOME | End: 2025-04-10
Attending: STUDENT IN AN ORGANIZED HEALTH CARE EDUCATION/TRAINING PROGRAM
Payer: COMMERCIAL

## 2025-04-10 DIAGNOSIS — N43.3 HYDROCELE, UNSPECIFIED HYDROCELE TYPE: ICD-10-CM

## 2025-04-10 PROCEDURE — 93975 VASCULAR STUDY: CPT | Performed by: STUDENT IN AN ORGANIZED HEALTH CARE EDUCATION/TRAINING PROGRAM

## 2025-04-10 PROCEDURE — 76870 US EXAM SCROTUM: CPT | Performed by: STUDENT IN AN ORGANIZED HEALTH CARE EDUCATION/TRAINING PROGRAM

## 2025-04-15 ENCOUNTER — OFFICE VISIT (OUTPATIENT)
Dept: SURGERY | Facility: CLINIC | Age: 45
End: 2025-04-15

## 2025-04-15 ENCOUNTER — TELEPHONE (OUTPATIENT)
Dept: SURGERY | Facility: CLINIC | Age: 45
End: 2025-04-15

## 2025-04-15 DIAGNOSIS — R82.90 URINE FINDING: Primary | ICD-10-CM

## 2025-04-15 DIAGNOSIS — N43.3 HYDROCELE, UNSPECIFIED HYDROCELE TYPE: Primary | ICD-10-CM

## 2025-04-15 LAB
APPEARANCE: CLEAR
BILIRUBIN: NEGATIVE
GLUCOSE (URINE DIPSTICK): NEGATIVE MG/DL
KETONES (URINE DIPSTICK): NEGATIVE MG/DL
LEUKOCYTES: NEGATIVE
MULTISTIX LOT#: NORMAL NUMERIC
NITRITE, URINE: NEGATIVE
OCCULT BLOOD: NEGATIVE
PH, URINE: 6.5 (ref 4.5–8)
PROTEIN (URINE DIPSTICK): NEGATIVE MG/DL
SPECIFIC GRAVITY: 1.01 (ref 1–1.03)
URINE-COLOR: YELLOW
UROBILINOGEN,SEMI-QN: 0.2 MG/DL (ref 0–1.9)

## 2025-04-15 PROCEDURE — 99204 OFFICE O/P NEW MOD 45 MIN: CPT | Performed by: SURGERY

## 2025-04-15 PROCEDURE — 81003 URINALYSIS AUTO W/O SCOPE: CPT | Performed by: SURGERY

## 2025-04-15 NOTE — PROGRESS NOTES
Urology Clinic Note - New Patient    Referring Provider:  No referring provider defined for this encounter.     Primary Care Provider:  Hero Linn MD     Chief Complaint:   Right hydrocele    HPI & Assessment:   Juan Peters is a 45 year old male with history of MS, CVA referred for hydrocele.    He had a bilateral inguinal hernia repair 2 years ago.  Over the past few months he has noticed enlarging right scrotal swelling.  This causes him discomfort.    Scrotal ultrasound 4/10/2025 shows a large right hydrocele.  Physical exam consistent with moderate right hydrocele.    He is on aspirin 81 mg daily.    AUA symptom score is 3/1 with LUTS.    Plan:   - OR for right hydrocelectomy  - Discussed risks/benefits including bleeding, infection, persistent swelling, hydrocele recurrence, damage to surrounding structures, need for additional procedures       PSA:  Lab Results   Component Value Date    PSA 0.823 09/22/2020        History:   Past Medical History[1]    Past Surgical History[2]    Family History[3]    Short Social Hx on File[4]    Medications (Active prior to today's visit):  Current Medications[5]    Allergies:  Allergies[6]      Review of Systems:   A comprehensive 10-point review of systems was completed.  Pertinent positives and negatives are noted in the the HPI.    Physical Exam:   CONSTITUTIONAL: Well developed, well nourished, in no acute distress  NEUROLOGIC: Alert and oriented  HEAD: Normocephalic, atraumatic  EYES: Sclera non-icteric  ENT: Hearing intact, moist mucous membranes  NECK: No obvious goiter or masses  RESPIRATORY: Normal respiratory effort  SKIN: No evident rashes  ABDOMEN: Soft, non-tender, non-distended  GENITOURINARY: Normal phallus, orthotopic meatus, normal left testicle, moderate right hydrocele    Thank you for this consult.    I have personally reviewed all relevant medical records, labs, and imaging.    Medical Decision Making  Right hydrocele: Undiagnosed new  problem    Amount and/or Complexity of Data Reviewed  External Data Reviewed: labs and notes.  Radiology: independent interpretation performed.    Risk  Minor surgery with identified risk factors.        Merrick Lewis MD  Staff Urologist  [facility]  Office: [phone]           [1]   Past Medical History:   MS (multiple sclerosis) (HCC)    PFO (patent foramen ovale) (HCC)    Stroke (HCC)    10/2022    Visual impairment    contacts/glasses   [2]   Past Surgical History:  Procedure Laterality Date    Laparoscopic inguinal hernia repair Bilateral 09/18/2023    Other surgical history  01/01/2001    nasal passage surgery    Pfo closure - external referral  12/19/2023    Urine flow measurement  12/01/2009    normal   [3]   Family History  Problem Relation Age of Onset    Diabetes Maternal Grandfather     Neurological Disorder Maternal Grandmother         dementia    Neurological Disorder Brother         tic   [4]   Social History  Socioeconomic History    Marital status:    Occupational History    Occupation:    Tobacco Use    Smoking status: Never    Smokeless tobacco: Never   Vaping Use    Vaping status: Never Used   Substance and Sexual Activity    Alcohol use: Not Currently     Comment: none for the last 10 months    Drug use: No   Other Topics Concern     Service No    Blood Transfusions No    Caffeine Concern Yes     Comment: 3 cups of coffee daily; energy drinks 2 cans week    Occupational Exposure No    Hobby Hazards No    Sleep Concern No    Stress Concern No    Weight Concern No    Special Diet No    Back Care Yes    Exercise Yes     Comment: walks dog 20 minute day    Bike Helmet Yes    Seat Belt Yes    Self-Exams Yes     Social Drivers of Health     Food Insecurity: Low Risk  (12/19/2023)    Received from [facility]    Food Insecurity     Within the past 12 months, you worried that your food would run out before you got money to buy more.  :  Never true     Within the past 12 months, the food you bought just didn't last and you didn't have money to get more. : Never true   Transportation Needs: No Transportation Needs (6/14/2023)    Received from [facility]    PRAPARE - Transportation     Lack of Transportation (Medical): No     Lack of Transportation (Non-Medical): No   Housing Stability: Low Risk  (6/14/2023)    Received from [facility]    Housing Stability     What is your living situation today?: I have a steady place to live   [5]   Current Outpatient Medications   Medication Sig Dispense Refill    buPROPion  MG Oral Tablet 24 Hr Take 1 tablet (300 mg total) by mouth daily.      Acetaminophen  MG Oral Tab CR Take 2 tablets (1,300 mg total) by mouth every 8 (eight) hours.      ketoconazole 2 % External Cream       predniSONE 20 MG Oral Tab       HYDROcodone-acetaminophen 5-325 MG Oral Tab Take 1 tablet by mouth every 4 (four) hours as needed for Pain. (Patient not taking: Reported on 4/9/2025) 12 tablet 0    Glatiramer Acetate (COPAXONE) 40 MG/ML Subcutaneous Solution Prefilled Syringe Inject 40 mg into the skin 3 (three) times a week. Monday, Wednesday, Friday 12 each 6    aspirin 81 MG Oral Chew Tab Chew 1 tablet (81 mg total) by mouth in the morning.      Loratadine 10 MG Oral Tablet Dispersible Take 1 tablet (10 mg total) by mouth daily.      finasteride 1 MG Oral Tab Take 1 tablet (1 mg total) by mouth daily.     [6]   Allergies  Allergen Reactions    Tramadol NAUSEA AND VOMITING

## 2025-04-15 NOTE — TELEPHONE ENCOUNTER
Hi,    Can you please schedule this patient for surgery.    Will need CBC, BMP, PT/INR 2 weeks prior to surgery.      Hold aspirin for 7 days prior to surgery.    Thanks,  Merrick     Urology Surgery Request  Surgeon: Merrick Lewis  Location (if known): [facility]  Procedure: RIGHT hydrocelectomy  Anesthesia: General   Time Frame: Next available  Time required: 75 minutes  Diagnosis: Hydrocele  Special Equipment: None    Antibiotics: per hospital protocol unless checked below   ___ Levaquin 500 mg IV   ___ Gemcitabine 2 g/100 mL NS bladder instillation to be given in OR    Estimated Post Op/Follow Up Appt:   4-6 days for drain removal with PA/NP. 4 weeks postop with me. Please schedule appointment at time of surgery scheduling.

## 2025-04-16 NOTE — TELEPHONE ENCOUNTER
Spoke with the patient.  Scheduling the surgery for 5/19/25. I also scheduled a 4-6 day f/u with PA on 5/23/25 @9am. Also 4week f/u michelle/MD 6/17/25 @ 1:30pm   Reviewed the pre-op instructions and will send via My Chart or mail to patient once confirmed.  Patient can contact me at [phone]

## 2025-05-09 ENCOUNTER — APPOINTMENT (OUTPATIENT)
Dept: ADMINISTRATIVE | Facility: HOSPITAL | Age: 45
End: 2025-05-09
Payer: COMMERCIAL

## 2025-05-09 RX ORDER — CELECOXIB 200 MG/1
200 CAPSULE ORAL DAILY
COMMUNITY
Start: 2025-04-23 | End: 2025-05-12

## 2025-05-09 RX ORDER — SENNOSIDES 8.6 MG
325 CAPSULE ORAL DAILY
COMMUNITY
Start: 2025-03-12 | End: 2025-05-09

## 2025-05-09 RX ORDER — ONDANSETRON 4 MG/1
4 TABLET, ORALLY DISINTEGRATING ORAL EVERY 8 HOURS PRN
COMMUNITY
Start: 2025-02-14 | End: 2025-05-09

## 2025-05-12 ENCOUNTER — OFFICE VISIT (OUTPATIENT)
Dept: NEUROLOGY | Facility: CLINIC | Age: 45
End: 2025-05-12
Payer: COMMERCIAL

## 2025-05-12 VITALS
HEART RATE: 84 BPM | RESPIRATION RATE: 16 BRPM | SYSTOLIC BLOOD PRESSURE: 108 MMHG | DIASTOLIC BLOOD PRESSURE: 72 MMHG | BODY MASS INDEX: 28.04 KG/M2 | HEIGHT: 68 IN | WEIGHT: 185 LBS

## 2025-05-12 DIAGNOSIS — R83.8 CSF OLIGOCLONAL BANDS: ICD-10-CM

## 2025-05-12 DIAGNOSIS — Z86.69 HX OF OPTIC NEURITIS: ICD-10-CM

## 2025-05-12 DIAGNOSIS — G35 MULTIPLE SCLEROSIS (HCC): Primary | ICD-10-CM

## 2025-05-12 PROCEDURE — 3008F BODY MASS INDEX DOCD: CPT | Performed by: OTHER

## 2025-05-12 PROCEDURE — 99214 OFFICE O/P EST MOD 30 MIN: CPT | Performed by: OTHER

## 2025-05-12 PROCEDURE — 3074F SYST BP LT 130 MM HG: CPT | Performed by: OTHER

## 2025-05-12 PROCEDURE — 3078F DIAST BP <80 MM HG: CPT | Performed by: OTHER

## 2025-05-12 NOTE — PROGRESS NOTES
NEUROLOGY  [facility]       Juan Peters  1/9/1980  Primary Care Provider:  Hero Linn MD    5/12/2025  45 year old yo,  was last seen on:: June 2024    Seen for/plans last visit:  MS    Previous visit and existing record notes reviewed in preparation for the face to face visit.  Relevant labs and studies reviewed and will be noted in relevant areas of this record.  Accompanied visit:     (x) No.      Present condition:  He was diagnosed in 2022 after presenting with left optic neuritis and spinal fluid showed oligoclonal bands.  He tested negative for aquaporin.  Based on safety, he was put on Copaxone which he continues to take at this time.  He has not had any symptom recurrence that would suggest an MS flareup.    Denies any injection fatigue or injection site reactions.    Past History update/new problem(s): As noted above    Review of Systems:  Review of Systems:  Denies systemic symptoms.     No CP or SOB.  No GI or  symptoms. Relevant Neuro as noted above.      Medications:    Medications - Current[1]  PRN: PRN Medications[2]    Allergies:  Allergies[3]       EXAM:  /72 (BP Location: Left arm, Patient Position: Sitting, Cuff Size: adult)   Pulse 84   Resp 16   Ht 68\"   Wt 185 lb (83.9 kg)   BMI 28.13 kg/m²   Looks stated age  General Exam:  HENT:  pink conjunctiva anicteric sclerae  Neck no adenopathy, thyroid normal  Heart and Lungs:  normal  Extremities: no cyanosis, skin changes    NEURO  NEURO  Able to relate events with fluent speech and intact comprehension  CN 2-12: pupils reactive, VF full face symmetric sensation and movement tongue midline  No motor focal findings  Sensory: no lateralizing findings  Reflexes are symmetric  UMN signs: none  Gait: narrow based          INTERPRETATION of RELEVANT LABS and other DATA:    July 2024 MRI reviewed      Problem/s Identified this visit:   1. Multiple sclerosis (HCC)    2. CSF oligoclonal bands    3. Hx of optic  neuritis on the left          Discussion plus Diagnostics & Treatment Orders:      Procedures    z [facility] MRI BRAIN (W+WO) (CPT=70553)     Continue Copaxone for now      (x) Discussed potential side effects of any treatment relevant to above.  Includes explanation of tests as necessary.    Return in about 1 year (around 5/12/2026).      Patient understands that if needed, based on condition and or test results, follow up will be readjusted      Efren Aviles MD  Vascular & General Neurology  Director, Multiple Sclerosis Program  [facility]  5/12/2025, Time completed 2:24 PM    Decision making:  ( x ) labs reviewed/ordered - 1  (  ) new diagnosis: - 1  ( x) Images & studies independently reviewed -non F2F  (  ) Case/studies discussed with other caregivers - -non F2F  (  ) Telephone time with patiern or authorized Fam member--non F2F  ( x ) other records reviewed --non F2F including consultations  (  ) [facility] meetings - patient not present --non F2F  (  ) Independent Historian obtained    Non Face to Face CPT code 98011/00561 applies as documented above    PROCEDURE DONE     (   ) see notes        After visit, patient was escorted out and handed-off discharge process and instructions to the check out desk.  No additional issues relevant to visit were raised to staff at this time interval.        This document is to be interpreted as my current opinion regarding the case as of the stated date of service based on the information available to me at this time and may supersedes any prior opinion expressed either orally or in writing.  Services rendered are only within the scope of direct medical care  Sometimes, reports may have been prepared partially using a speech recognition software technology.  If a word or phrase is confusing or out of context, please do not hesitate to call for clarification.              [1]   Current Outpatient Medications:     buPROPion  MG Oral Tablet 24 Hr, Take 1  tablet (300 mg total) by mouth daily., Disp: , Rfl:     Acetaminophen  MG Oral Tab CR, Take 2 tablets (1,300 mg total) by mouth every 8 (eight) hours., Disp: , Rfl:     ketoconazole 2 % External Cream, , Disp: , Rfl:     Glatiramer Acetate (COPAXONE) 40 MG/ML Subcutaneous Solution Prefilled Syringe, Inject 40 mg into the skin 3 (three) times a week. Monday, Wednesday, Friday (Patient taking differently: Inject 40 mg into the skin 3 (three) times a week. Monday, Wednesday, Friday  MS), Disp: 12 each, Rfl: 6    Loratadine 10 MG Oral Tablet Dispersible, Take 1 tablet (10 mg total) by mouth in the morning., Disp: , Rfl:     finasteride 1 MG Oral Tab, Take 1 tablet (1 mg total) by mouth in the morning., Disp: , Rfl:   [2] [3]   Allergies  Allergen Reactions    Tramadol NAUSEA AND VOMITING

## 2025-05-12 NOTE — PATIENT INSTRUCTIONS
Refill policies:    Allow 2-3 business days for refills; controlled substances may take longer.  Contact your pharmacy at least 5 days prior to running out of medication and have them send an electronic request or submit request through the “request refill” option in your [employer] account.  Refills are not addressed on weekends; covering physicians do not authorize routine medications on weekends.  No narcotics or controlled substances are refilled after noon on Fridays or by on call physicians.  By law, narcotics must be electronically prescribed.  A 30 day supply with no refills is the maximum allowed.  If your prescription is due for a refill, you may be due for a follow up appointment.  To best provide you care, patients receiving routine medications need to be seen at least once a year.  Patients receiving narcotic/controlled substance medications need to be seen at least once every 3 months.  In the event that your preferred pharmacy does not have the requested medication in stock (e.g. Backordered), it is your responsibility to find another pharmacy that has the requested medication available.  We will gladly send a new prescription to that pharmacy at your request.    Scheduling Tests:    If your physician has ordered radiology tests such as MRI or CT scans, please contact Central Scheduling at [phone] right away to schedule the test.  Once scheduled, the [facility] Centralized Referral Team will work with your insurance carrier to obtain pre-certification or prior authorization.  Depending on your insurance carrier, approval may take 3-10 days.  It is highly recommended patients assure they have received an authorization before having a test performed.  If test is done without insurance authorization, patient may be responsible for the entire amount billed.      Precertification and Prior Authorizations:  If your physician has recommended that you have a procedure or additional testing performed the [facility]  Centralized Referral Team will contact your insurance carrier to obtain pre-certification or prior authorization.    You are strongly encouraged to contact your insurance carrier to verify that your procedure/test has been approved and is a COVERED benefit.  Although the [facility] Centralized Referral Team does its due diligence, the insurance carrier gives the disclaimer that \"Although the procedure is authorized, this does not guarantee payment.\"    Ultimately the patient is responsible for payment.   Thank you for your understanding in this matter.  Paperwork Completion:  If you require FMLA or disability paperwork for your recovery, please make sure to either drop it off or have it faxed to our office at [phone]. Be sure the form has your name and date of birth on it.  The form will be faxed to our Forms Department and they will complete it for you.  There is a 25$ fee for all forms that need to be filled out.  Please be aware there is a 10-14 day turnaround time.  You will need to sign a release of information (TERE) form if your paperwork does not come with one.  You may call the Forms Department with any questions at [phone].  Their fax number is [phone].

## 2025-05-12 NOTE — PROGRESS NOTES
NEUROLOGY  [facility]       Juan Peters  1/9/1980  Primary Care Provider:  Hero Linn MD    5/12/2025  45 year old yo,  was last seen on:: June 2024    Seen for/plans last visit:  MS diagnosed in 2022 after presenting with ON left side      Previous visit and existing record notes reviewed in preparation for the face to face visit.  Relevant labs and studies reviewed and will be noted in relevant areas of this record.  Accompanied visit:     (x) No.      Present condition:  Diagnosed in 2022 and put on Copaxone in 2023.  Doing OK with Copaxone    Patient shattered his right heel falling off ladder while trying to get [place] lights down    Had to have surgery and is now on PTx      Past History update/new problem(s): no new except above    Review of Systems:  Review of Systems:  Denies systemic symptoms     No CP or SOB.  No GI or  symptoms. Relevant Neuro as noted above.      Medications:    Medications - Current[1]  PRN: PRN Medications[2]    Allergies:  Allergies[3]       EXAM:  /72 (BP Location: Left arm, Patient Position: Sitting, Cuff Size: adult)   Pulse 84   Resp 16   Ht 68\"   Wt 185 lb (83.9 kg)   BMI 28.13 kg/m²   Looks stated age  General Exam:  HENT:  pink conjunctiva anicteric sclerae  Neck no adenopathy, thyroid normal  Heart and Lungs:  normal  Extremities: no cyanosis, skin changes    NEURO  NEURO  Able to relate events with fluent speech and intact comprehension  CN 2-12: pupils reactive, VF full face symmetric sensation and movement tongue midline  No motor focal findings  Sensory: no lateralizing findings  Reflexes are symmetric  UMN signs: none  Gait: narrow based          INTERPRETATION of RELEVANT LABS and other DATA:    MRI in July 2024 2022  (8) oligoclonal bands were observed in the CSF, which were not   detected in the serum sample.     NMO IgG   <1.5    Problem/s Identified this visit:   1. Multiple sclerosis (HCC)    2. CSF  oligoclonal bands    3. Hx of optic neuritis on the left          Discussion plus Diagnostics & Treatment Orders:  Patient is doing relatively well with Copaxone.  Although he presented with optic neuritis, he is aquaporin test was negative and CSF demonstrated the presence of 8 oligoclonal bands.  There is a lesion in the medulla right in the area of the area postrema which we need to monitor.        Procedures    z Insight MRI BRAIN (W+WO) (CPT=70553)           (x) Discussed potential side effects of any treatment relevant to above.  Includes explanation of tests as necessary.    Return in about 1 year (around 5/12/2026).      Patient understands that if needed, based on condition and or test results, follow up will be readjusted      Efren Aviles MD  Vascular & General Neurology  Director, Multiple Sclerosis Program  [facility]  5/12/2025, Time completed 2:25 PM    Decision making:  ( x ) labs reviewed/ordered - 1  (  ) new diagnosis: - 1  ( x) Images & studies independently reviewed -non F2F  (  ) Case/studies discussed with other caregivers - -non F2F  (  ) Telephone time with patiern or authorized [facility] member--non F2F  ( x ) other records reviewed --non F2F including consultations  (  ) [facility] meetings - patient not present --non F2F  (  ) Independent Historian obtained    Non Face to Face CPT code 47195/32929 applies as documented above    PROCEDURE DONE     (   ) see notes        After visit, patient was escorted out and handed-off discharge process and instructions to the check out desk.  No additional issues relevant to visit were raised to staff at this time interval.        This document is to be interpreted as my current opinion regarding the case as of the stated date of service based on the information available to me at this time and may supersedes any prior opinion expressed either orally or in writing.  Services rendered are only within the scope of direct medical care   Sometimes, reports may have been prepared partially using a speech recognition software technology.  If a word or phrase is confusing or out of context, please do not hesitate to call for clarification.              [1]   Current Outpatient Medications:     buPROPion  MG Oral Tablet 24 Hr, Take 1 tablet (300 mg total) by mouth daily., Disp: , Rfl:     Acetaminophen  MG Oral Tab CR, Take 2 tablets (1,300 mg total) by mouth every 8 (eight) hours., Disp: , Rfl:     ketoconazole 2 % External Cream, , Disp: , Rfl:     Glatiramer Acetate (COPAXONE) 40 MG/ML Subcutaneous Solution Prefilled Syringe, Inject 40 mg into the skin 3 (three) times a week. Monday, Wednesday, Friday (Patient taking differently: Inject 40 mg into the skin 3 (three) times a week. Monday, Wednesday, Friday  MS), Disp: 12 each, Rfl: 6    Loratadine 10 MG Oral Tablet Dispersible, Take 1 tablet (10 mg total) by mouth in the morning., Disp: , Rfl:     finasteride 1 MG Oral Tab, Take 1 tablet (1 mg total) by mouth in the morning., Disp: , Rfl:   [2] [3]   Allergies  Allergen Reactions    Tramadol NAUSEA AND VOMITING

## 2025-05-18 ENCOUNTER — LAB ENCOUNTER (OUTPATIENT)
Dept: LAB | Facility: HOSPITAL | Age: 45
End: 2025-05-18
Attending: SURGERY
Payer: COMMERCIAL

## 2025-05-18 ENCOUNTER — ANESTHESIA EVENT (OUTPATIENT)
Dept: SURGERY | Facility: HOSPITAL | Age: 45
End: 2025-05-18
Payer: COMMERCIAL

## 2025-05-18 DIAGNOSIS — N43.3 HYDROCELE: ICD-10-CM

## 2025-05-18 LAB
ANION GAP SERPL CALC-SCNC: 5 MMOL/L (ref 0–18)
BASOPHILS # BLD AUTO: 0.03 X10(3) UL (ref 0–0.2)
BASOPHILS NFR BLD AUTO: 0.8 %
BUN BLD-MCNC: 17 MG/DL (ref 9–23)
CALCIUM BLD-MCNC: 9.9 MG/DL (ref 8.7–10.6)
CHLORIDE SERPL-SCNC: 106 MMOL/L (ref 98–112)
CO2 SERPL-SCNC: 27 MMOL/L (ref 21–32)
CREAT BLD-MCNC: 0.92 MG/DL (ref 0.7–1.3)
EGFRCR SERPLBLD CKD-EPI 2021: 105 ML/MIN/1.73M2 (ref 60–?)
EOSINOPHIL # BLD AUTO: 0.07 X10(3) UL (ref 0–0.7)
EOSINOPHIL NFR BLD AUTO: 1.8 %
ERYTHROCYTE [DISTWIDTH] IN BLOOD BY AUTOMATED COUNT: 12.3 %
FASTING STATUS PATIENT QL REPORTED: NO
GLUCOSE BLD-MCNC: 82 MG/DL (ref 70–99)
HCT VFR BLD AUTO: 43.4 % (ref 39–53)
HGB BLD-MCNC: 16.1 G/DL (ref 13–17.5)
IMM GRANULOCYTES # BLD AUTO: 0.01 X10(3) UL (ref 0–1)
IMM GRANULOCYTES NFR BLD: 0.3 %
INR BLD: 0.98 (ref 0.8–1.2)
LYMPHOCYTES # BLD AUTO: 1.23 X10(3) UL (ref 1–4)
LYMPHOCYTES NFR BLD AUTO: 31.8 %
MCH RBC QN AUTO: 31.6 PG (ref 26–34)
MCHC RBC AUTO-ENTMCNC: 37.1 G/DL (ref 31–37)
MCV RBC AUTO: 85.3 FL (ref 80–100)
MONOCYTES # BLD AUTO: 0.33 X10(3) UL (ref 0.1–1)
MONOCYTES NFR BLD AUTO: 8.5 %
NEUTROPHILS # BLD AUTO: 2.2 X10 (3) UL (ref 1.5–7.7)
NEUTROPHILS # BLD AUTO: 2.2 X10(3) UL (ref 1.5–7.7)
NEUTROPHILS NFR BLD AUTO: 56.8 %
OSMOLALITY SERPL CALC.SUM OF ELEC: 287 MOSM/KG (ref 275–295)
PLATELET # BLD AUTO: 219 10(3)UL (ref 150–450)
POTASSIUM SERPL-SCNC: 4.2 MMOL/L (ref 3.5–5.1)
PROTHROMBIN TIME: 13.1 SECONDS (ref 11.6–14.8)
RBC # BLD AUTO: 5.09 X10(6)UL (ref 4.3–5.7)
SODIUM SERPL-SCNC: 138 MMOL/L (ref 136–145)
WBC # BLD AUTO: 3.9 X10(3) UL (ref 4–11)

## 2025-05-18 PROCEDURE — 80048 BASIC METABOLIC PNL TOTAL CA: CPT

## 2025-05-18 PROCEDURE — 36415 COLL VENOUS BLD VENIPUNCTURE: CPT

## 2025-05-18 PROCEDURE — 85025 COMPLETE CBC W/AUTO DIFF WBC: CPT

## 2025-05-18 PROCEDURE — 85610 PROTHROMBIN TIME: CPT

## 2025-05-18 NOTE — DISCHARGE INSTRUCTIONS
Instructions:    - Your follow-up appointment is listed below. Please contact us at [phone] if you need to change your appointment.    Your appointments       Date & Time Appointment Department ([place])    May 23, 2025 9:00 AM CDT Post Op Visit with Madhavi Hdz PA-C [facility] ([facility])        Jun 17, 2025 1:30 PM CDT Post Op Visit with Merrick Lewis MD [facility] ([facility])              [facility] [address]  [address]  [facility] [address] [phone]           -We will setup removal of the scrotal drain next Monday or Tuesday. It will drain some bloody fluid while in place. Change dry gauze dressings to catch this fluid as needed.    - Please take it easy for the next 48 hours. You should be mostly sitting or laying down in the first 24 hours. You can use a bag of frozen peas to ice the area (with thin towel in between so not on the skin) a few times a day for 15 minutes at a time to help reduce pain and swelling.     - No heavy lifting or strenuous activity for 2 weeks. You should use a scrotal support (athletic supporter) or supportive underwear for the next 2 weeks.    - Shower once daily starting in 2 days. Let the soapy water run over the incision and do not scrub. You might have dissolvable stitches on the skin, which will scab over and flake off within 1-2 weeks. You should not soak, bathe, or swim for 14 days.    - No sex for 14 days after surgery.     - You may experience pain after the procedure for a few days.  If pain becomes intolerable please contact our office or go to the nearest Emergency Room/Immediate Care. You make take over-the counter ibuprofen for mild pain (provided you do not have a medical condition such as stomach ulcers or kidney disease which prohibits you from taking). You may take this in  addition to tylenol or narcotic pain medications if prescribed. Try to stop any narcotic pain medications as soon as possible after surgery when your pain improves.     - If you take blood thinners (such as aspirin or plavix) please DO NOT take them when you go home. You may resume these medications in 3 days.    - If you are medically allowed to take ibuprofen then you may take 200-400 mg every 8 hours as needed for pain with plenty of fluids. You may take this in addition to tylenol or other pain medication which may be prescribed.     - If you develop a fever > 101 degrees, chills, difficulty urinating or significant abdominal pain in the next 24 hours, call the office.      Merrick Lewis MD  Staff Urologist  [facility]  Office: [phone]    You received a drug called Toradol which is an Anti Inflammatory at:0819am  If you are allowed to take Anti inflammatories:    Do not take any Anti Inflammatory like Motrin, Aleve or Ibuprophen until after:0219pm  Please report any suspected allergic reactions or bleeding issues to your doctor

## 2025-05-18 NOTE — H&P
UROLOGY HISTORY & PHYSICAL      Juan Peters Patient Status:  Hospital Outpatient Surgery    1980 MRN [id number]   Location [facility] SURGERY Attending Merrick Lewis MD   Hosp Day # 0 PCP Hero Linn MD       Chief Complaint:   Right hydrocele     HPI & Assessment:   Juan Peters is a 45 year old male with history of MS, CVA referred for hydrocele.     He had a bilateral inguinal hernia repair 2 years ago.  Over the past few months he has noticed enlarging right scrotal swelling.  This causes him discomfort.     Scrotal ultrasound 4/10/2025 shows a large right hydrocele.  Physical exam consistent with moderate right hydrocele.     He is on aspirin 81 mg daily. Held for the past 1 week.    Plan:   - OR for right hydrocelectomy   - Informed consent obtained - risks and benefits explained, all questions answered       History:   Past Medical History[1]    Past Surgical History[2]    Family History[3]    Short Social Hx on File[4]    Medications (Active prior to today's visit):  Current Medications[5]    Allergies:  Allergies[6]    Review of Systems:   A comprehensive 10-point review of systems was completed.  Pertinent positives and negatives are noted in the the HPI.    Physical Exam:   Vital Signs:  Height 5' 8\" (1.727 m), weight 185 lb (83.9 kg).     CONSTITUTIONAL: Well developed, well nourished, in no acute distress  NEUROLOGIC: Alert and oriented  HEAD: Normocephalic, atraumatic  EYES: Sclera non-icteric  ENT: Hearing intact, moist mucous membranes  NECK: No obvious goiter or masses  RESPIRATORY: Normal respiratory effort  SKIN: No evident rashes  ABDOMEN: Soft, non-tender, non-distended    Laboratory Data:  Lab Results   Component Value Date    WBC 3.9 (L) 2025    HGB 16.1 2025    .0 2025     Lab Results   Component Value Date     2025    K 4.2 2025     2025    CO2 27.0 2025    BUN 17 2025    GLU 82 2025     GFRAA 137 02/20/2014    AST 28 09/22/2020    ALT 21 09/22/2020    TP 7.1 09/22/2020    ALB 4.8 09/22/2020    CA 9.9 05/18/2025       Urinalysis Results (last three years):  Recent Labs     04/15/25  1411   SPECGRAVITY 1.010   PHURINE 6.5   NITRITE Negative       Urine Culture Results (last three years):  No results found for: \"URINECUL\"    PSA:  Lab Results   Component Value Date    PSA 0.823 09/22/2020        Imaging (last three days):  No results found.       I have personally reviewed all relevant medical records, labs, and imaging.     Merrick Lewis MD  Staff Urologist  [facility]  Office: [phone]             [1]   Past Medical History:   Arrhythmia    AFIB NO MEDS    Blood disorder    MTHFR    MS (multiple sclerosis) ([facility])    Optic neuritis    LEFT EYE    PFO (patent foramen ovale) ([facility])    PONV (postoperative nausea and vomiting)    Stroke ([facility])    2014, 2022. ALL SYMPTOMS RESOLVED. MED MANAGEMENT    Visual impairment    contacts/glasses   [2]   Past Surgical History:  Procedure Laterality Date    Fracture surgery      RIGHT HEEL SURGERY    Hernia surgery  09/20/2023    Laparoscopic inguinal hernia repair Bilateral 09/18/2023    Other surgical history  01/01/2001    nasal passage surgery    Pfo closure - external referral  12/19/2023    Urine flow measurement  12/01/2009    normal   [3]   Family History  Problem Relation Age of Onset    Diabetes Maternal Grandfather     Neurological Disorder Maternal Grandmother         dementia    Neurological Disorder Brother         tic   [4]   Social History  Socioeconomic History    Marital status:    Occupational History    Occupation:    Tobacco Use    Smoking status: Never    Smokeless tobacco: Never   Vaping Use    Vaping status: Never Used   Substance and Sexual Activity    Alcohol use: Not Currently     Comment: none for the last 10 months    Drug use: No   Other Topics Concern     Service No    Blood  Transfusions No    Caffeine Concern Yes     Comment: 3 cups of coffee daily; energy drinks 2 cans week    Occupational Exposure No    Hobby Hazards No    Sleep Concern No    Stress Concern No    Weight Concern No    Special Diet No    Back Care Yes    Exercise Yes     Comment: walks dog 20 minute day    Bike Helmet Yes    Seat Belt Yes    Self-Exams Yes     Social Drivers of Health     Food Insecurity: Low Risk  (12/19/2023)    Received from [facility]    Food Insecurity     Within the past 12 months, you worried that your food would run out before you got money to buy more.  : Never true     Within the past 12 months, the food you bought just didn't last and you didn't have money to get more. : Never true   Transportation Needs: No Transportation Needs (6/14/2023)    Received from [facility]    PRAPARE - Transportation     Lack of Transportation (Medical): No     Lack of Transportation (Non-Medical): No   Housing Stability: Low Risk  (6/14/2023)    Received from [facility]    Housing Stability     What is your living situation today?: I have a steady place to live   [5]   Current Outpatient Medications   Medication Sig Dispense Refill    buPROPion  MG Oral Tablet 24 Hr Take 1 tablet (300 mg total) by mouth daily.      Acetaminophen  MG Oral Tab CR Take 2 tablets (1,300 mg total) by mouth every 8 (eight) hours.      ketoconazole 2 % External Cream       Glatiramer Acetate (COPAXONE) 40 MG/ML Subcutaneous Solution Prefilled Syringe Inject 40 mg into the skin 3 (three) times a week. Monday, Wednesday, Friday (Patient taking differently: Inject 40 mg into the skin 3 (three) times a week. Monday, Wednesday, Friday   MS) 12 each 6    Loratadine 10 MG Oral Tablet Dispersible Take 1 tablet (10 mg total) by mouth in the morning.      finasteride 1 MG Oral Tab Take 1 tablet (1 mg total) by mouth in the morning.     [6]   Allergies  Allergen Reactions    Tramadol NAUSEA AND VOMITING

## 2025-05-19 ENCOUNTER — ANESTHESIA (OUTPATIENT)
Dept: SURGERY | Facility: HOSPITAL | Age: 45
End: 2025-05-19
Payer: COMMERCIAL

## 2025-05-19 ENCOUNTER — HOSPITAL ENCOUNTER (OUTPATIENT)
Facility: HOSPITAL | Age: 45
Setting detail: HOSPITAL OUTPATIENT SURGERY
Discharge: HOME | End: 2025-05-19
Attending: SURGERY | Admitting: SURGERY
Payer: COMMERCIAL

## 2025-05-19 VITALS
DIASTOLIC BLOOD PRESSURE: 82 MMHG | BODY MASS INDEX: 28.04 KG/M2 | OXYGEN SATURATION: 100 % | WEIGHT: 185 LBS | RESPIRATION RATE: 16 BRPM | HEIGHT: 68 IN | SYSTOLIC BLOOD PRESSURE: 113 MMHG | TEMPERATURE: 98 F | HEART RATE: 75 BPM

## 2025-05-19 DIAGNOSIS — N43.3 HYDROCELE: Primary | ICD-10-CM

## 2025-05-19 DIAGNOSIS — N43.3 HYDROCELE, UNSPECIFIED HYDROCELE TYPE: ICD-10-CM

## 2025-05-19 PROCEDURE — 55040 REMOVAL OF HYDROCELE: CPT | Performed by: SURGERY

## 2025-05-19 RX ORDER — SODIUM CHLORIDE, SODIUM LACTATE, POTASSIUM CHLORIDE, CALCIUM CHLORIDE 600; 310; 30; 20 MG/100ML; MG/100ML; MG/100ML; MG/100ML
INJECTION, SOLUTION INTRAVENOUS CONTINUOUS
Status: DISCONTINUED | OUTPATIENT
Start: 2025-05-19 | End: 2025-05-19

## 2025-05-19 RX ORDER — TRAMADOL HYDROCHLORIDE 50 MG/1
25 TABLET ORAL EVERY 8 HOURS PRN
COMMUNITY
Start: 2025-05-13

## 2025-05-19 RX ORDER — HYDROMORPHONE HYDROCHLORIDE 1 MG/ML
0.4 INJECTION, SOLUTION INTRAMUSCULAR; INTRAVENOUS; SUBCUTANEOUS EVERY 5 MIN PRN
Status: DISCONTINUED | OUTPATIENT
Start: 2025-05-19 | End: 2025-05-19

## 2025-05-19 RX ORDER — ACETAMINOPHEN 500 MG
1000 TABLET ORAL ONCE AS NEEDED
Status: DISCONTINUED | OUTPATIENT
Start: 2025-05-19 | End: 2025-05-19

## 2025-05-19 RX ORDER — ONDANSETRON 2 MG/ML
INJECTION INTRAMUSCULAR; INTRAVENOUS AS NEEDED
Status: DISCONTINUED | OUTPATIENT
Start: 2025-05-19 | End: 2025-05-19 | Stop reason: SURG

## 2025-05-19 RX ORDER — LIDOCAINE HYDROCHLORIDE 10 MG/ML
INJECTION, SOLUTION EPIDURAL; INFILTRATION; INTRACAUDAL; PERINEURAL AS NEEDED
Status: DISCONTINUED | OUTPATIENT
Start: 2025-05-19 | End: 2025-05-19 | Stop reason: SURG

## 2025-05-19 RX ORDER — NALOXONE HYDROCHLORIDE 0.4 MG/ML
0.08 INJECTION, SOLUTION INTRAMUSCULAR; INTRAVENOUS; SUBCUTANEOUS AS NEEDED
Status: DISCONTINUED | OUTPATIENT
Start: 2025-05-19 | End: 2025-05-19

## 2025-05-19 RX ORDER — ONDANSETRON 2 MG/ML
4 INJECTION INTRAMUSCULAR; INTRAVENOUS EVERY 6 HOURS PRN
Status: DISCONTINUED | OUTPATIENT
Start: 2025-05-19 | End: 2025-05-19

## 2025-05-19 RX ORDER — SENNOSIDES 8.6 MG
CAPSULE ORAL
COMMUNITY
Start: 2025-03-12 | End: 2025-05-19

## 2025-05-19 RX ORDER — POLYETHYLENE GLYCOL 3350 17 G/17G
17 POWDER, FOR SOLUTION ORAL DAILY PRN
Qty: 20 PACKET | Refills: 1 | Status: SHIPPED | OUTPATIENT
Start: 2025-05-19

## 2025-05-19 RX ORDER — LIDOCAINE HYDROCHLORIDE 10 MG/ML
INJECTION, SOLUTION INFILTRATION; PERINEURAL AS NEEDED
Status: DISCONTINUED | OUTPATIENT
Start: 2025-05-19 | End: 2025-05-19 | Stop reason: HOSPADM

## 2025-05-19 RX ORDER — CEFUROXIME AXETIL 500 MG/1
500 TABLET ORAL 2 TIMES DAILY
COMMUNITY
Start: 2025-05-13 | End: 2025-05-23

## 2025-05-19 RX ORDER — CELECOXIB 200 MG/1
200 CAPSULE ORAL DAILY
COMMUNITY
Start: 2025-04-23 | End: 2025-05-19

## 2025-05-19 RX ORDER — ACETAMINOPHEN 500 MG
1000 TABLET ORAL ONCE
Status: DISCONTINUED | OUTPATIENT
Start: 2025-05-19 | End: 2025-05-19 | Stop reason: HOSPADM

## 2025-05-19 RX ORDER — SCOPOLAMINE 1 MG/3D
1 PATCH, EXTENDED RELEASE TRANSDERMAL ONCE
Status: DISCONTINUED | OUTPATIENT
Start: 2025-05-19 | End: 2025-05-19 | Stop reason: HOSPADM

## 2025-05-19 RX ORDER — DEXAMETHASONE SODIUM PHOSPHATE 4 MG/ML
VIAL (ML) INJECTION AS NEEDED
Status: DISCONTINUED | OUTPATIENT
Start: 2025-05-19 | End: 2025-05-19 | Stop reason: SURG

## 2025-05-19 RX ORDER — HYDROCODONE BITARTRATE AND ACETAMINOPHEN 10; 325 MG/1; MG/1
2 TABLET ORAL ONCE AS NEEDED
Status: DISCONTINUED | OUTPATIENT
Start: 2025-05-19 | End: 2025-05-19

## 2025-05-19 RX ORDER — HYDROCODONE BITARTRATE AND ACETAMINOPHEN 10; 325 MG/1; MG/1
1 TABLET ORAL ONCE AS NEEDED
Status: DISCONTINUED | OUTPATIENT
Start: 2025-05-19 | End: 2025-05-19

## 2025-05-19 RX ORDER — KETOROLAC TROMETHAMINE 30 MG/ML
INJECTION, SOLUTION INTRAMUSCULAR; INTRAVENOUS AS NEEDED
Status: DISCONTINUED | OUTPATIENT
Start: 2025-05-19 | End: 2025-05-19 | Stop reason: SURG

## 2025-05-19 RX ORDER — HYDROCODONE BITARTRATE AND ACETAMINOPHEN 5; 325 MG/1; MG/1
1 TABLET ORAL EVERY 4 HOURS PRN
Qty: 20 TABLET | Refills: 0 | Status: SHIPPED | OUTPATIENT
Start: 2025-05-19

## 2025-05-19 RX ORDER — HYDROMORPHONE HYDROCHLORIDE 1 MG/ML
0.2 INJECTION, SOLUTION INTRAMUSCULAR; INTRAVENOUS; SUBCUTANEOUS EVERY 5 MIN PRN
Status: DISCONTINUED | OUTPATIENT
Start: 2025-05-19 | End: 2025-05-19

## 2025-05-19 RX ORDER — BUPIVACAINE HYDROCHLORIDE 5 MG/ML
INJECTION, SOLUTION EPIDURAL; INTRACAUDAL; PERINEURAL AS NEEDED
Status: DISCONTINUED | OUTPATIENT
Start: 2025-05-19 | End: 2025-05-19 | Stop reason: HOSPADM

## 2025-05-19 RX ORDER — ROCURONIUM BROMIDE 10 MG/ML
INJECTION, SOLUTION INTRAVENOUS AS NEEDED
Status: DISCONTINUED | OUTPATIENT
Start: 2025-05-19 | End: 2025-05-19 | Stop reason: SURG

## 2025-05-19 RX ORDER — HYDROMORPHONE HYDROCHLORIDE 1 MG/ML
0.6 INJECTION, SOLUTION INTRAMUSCULAR; INTRAVENOUS; SUBCUTANEOUS EVERY 5 MIN PRN
Status: DISCONTINUED | OUTPATIENT
Start: 2025-05-19 | End: 2025-05-19

## 2025-05-19 RX ORDER — PROCHLORPERAZINE EDISYLATE 5 MG/ML
5 INJECTION INTRAMUSCULAR; INTRAVENOUS EVERY 8 HOURS PRN
Status: DISCONTINUED | OUTPATIENT
Start: 2025-05-19 | End: 2025-05-19

## 2025-05-19 RX ADMIN — SODIUM CHLORIDE, SODIUM LACTATE, POTASSIUM CHLORIDE, CALCIUM CHLORIDE: 600; 310; 30; 20 INJECTION, SOLUTION INTRAVENOUS at 08:45:00

## 2025-05-19 RX ADMIN — DEXAMETHASONE SODIUM PHOSPHATE 4 MG: 4 MG/ML VIAL (ML) INJECTION at 07:15:00

## 2025-05-19 RX ADMIN — KETOROLAC TROMETHAMINE 30 MG: 30 INJECTION, SOLUTION INTRAMUSCULAR; INTRAVENOUS at 08:19:00

## 2025-05-19 RX ADMIN — ONDANSETRON 4 MG: 2 INJECTION INTRAMUSCULAR; INTRAVENOUS at 08:19:00

## 2025-05-19 RX ADMIN — LIDOCAINE HYDROCHLORIDE 5 ML: 10 INJECTION, SOLUTION EPIDURAL; INFILTRATION; INTRACAUDAL; PERINEURAL at 07:10:00

## 2025-05-19 RX ADMIN — ROCURONIUM BROMIDE 40 MG: 10 INJECTION, SOLUTION INTRAVENOUS at 07:10:00

## 2025-05-19 NOTE — ANESTHESIA PROCEDURE NOTES
Airway  Date/Time: 5/19/2025 7:13 AM  Reason: elective      General Information and Staff   Patient location during procedure: OR  Anesthesiologist: Rubin Angel MD  Performed: anesthesiologist   Performed by: Rubin Angel MD  Authorized by: Rubin Angel MD        Indications and Patient Condition  Indications for airway management: anesthesia  Sedation level: deep      Preoxygenated: yesPatient position: sniffing    Mask difficulty assessment: 2 - vent by mask + OA or adjuvant +/- NMBA    Final Airway Details    Final airway type: endotracheal airway    Successful airway: ETT  Cuffed: yes   Successful intubation technique: direct laryngoscopy  Endotracheal tube insertion site: oral  Blade: Yina  Blade size: #3  ETT size (mm): 7.5    Cormack-Lehane Classification: grade IIA - partial view of glottis  Placement verified by: capnometry   Measured from: lips  ETT to lips (cm): 21  Number of attempts at approach: 1

## 2025-05-19 NOTE — ANESTHESIA PREPROCEDURE EVALUATION
PRE-OP EVALUATION    Patient Name: Juan Peters    Admit Diagnosis: Hydrocele, unspecified hydrocele type [N43.3]    Pre-op Diagnosis: Hydrocele, unspecified hydrocele type [N43.3]    RIGHT HYDROCELECTOMY    Anesthesia Procedure: RIGHT HYDROCELECTOMY (Right)    Surgeons and Role:     * Merrick Lewis MD - Primary    Pre-op vitals reviewed.  Temp: 97.6 °F (36.4 °C)  Pulse: 71  Resp: 18  BP: 111/75  SpO2: 99 %  Body mass index is 28.13 kg/m².    Current medications reviewed.  Hospital Medications:  Current Medications[1]    Outpatient Medications:   Prescriptions Prior to Admission[2]    Allergies: Patient has no known allergies.      Anesthesia Evaluation        Anesthetic Complications  (+) history of anesthetic complications  History of: PONV       GI/Hepatic/Renal    Negative GI/hepatic/renal ROS.                             Cardiovascular  Comment: Hx of PFO s/p closure      Exercise tolerance: good     MET: >4                                           Endo/Other    Negative endo/other ROS.                              Pulmonary    Negative pulmonary ROS.                       Neuro/Psych          (+) CVA and no residual deficits      (+) neuromuscular disease (Multiple Sclerosis)                     Past Surgical History[3]  Social Hx on file[4]  History   Drug Use No     Available pre-op labs reviewed.  Lab Results   Component Value Date    WBC 3.9 (L) 05/18/2025    RBC 5.09 05/18/2025    HGB 16.1 05/18/2025    HCT 43.4 05/18/2025    MCV 85.3 05/18/2025    MCH 31.6 05/18/2025    MCHC 37.1 (H) 05/18/2025    RDW 12.3 05/18/2025    .0 05/18/2025     Lab Results   Component Value Date     05/18/2025    K 4.2 05/18/2025     05/18/2025    CO2 27.0 05/18/2025    BUN 17 05/18/2025    CREATSERUM 0.92 05/18/2025    GLU 82 05/18/2025    CA 9.9 05/18/2025     Lab Results   Component Value Date    INR 0.98 05/18/2025         Airway      Mallampati: II  Mouth opening: >3 FB  TM distance: > 6  cm  Neck ROM: full Cardiovascular    Cardiovascular exam normal.         Dental    Dentition appears grossly intact         Pulmonary    Pulmonary exam normal.                 Other findings              ASA: 3   Plan: general  NPO status verified and patient meets guidelines.    Post-procedure pain management plan discussed with surgeon and patient.      Plan/risks discussed with: patient and spouse                Present on Admission:  **None**             [1]    acetaminophen (Tylenol Extra Strength) tab 1,000 mg  1,000 mg Oral Once    scopolamine (Transderm-Scop) 1 MG/3DAYS patch 1 patch  1 patch Transdermal Once    lactated ringers infusion   Intravenous Continuous    ceFAZolin (Ancef) 2g in 10mL IV syringe premix  2 g Intravenous Once   [2]   Medications Prior to Admission   Medication Sig Dispense Refill Last Dose/Taking    aspirin 325 MG Oral Tab EC TAKE 1 TABLET BY MOUTH EVERY DAY WITH BREAKFAST *BLOOD THINNER, START AM AFTER SURGERY   4/28/2025    cefuroxime 500 MG Oral Tab Take 1 tablet (500 mg total) by mouth 2 (two) times daily.   5/18/2025 Bedtime    celecoxib 200 MG Oral Cap Take 1 capsule (200 mg total) by mouth daily.   Taking    traMADol 50 MG Oral Tab Take 0.5 tablets (25 mg total) by mouth every 8 (eight) hours as needed for Pain.   5/18/2025 Bedtime    buPROPion  MG Oral Tablet 24 Hr Take 1 tablet (300 mg total) by mouth daily.   5/18/2025 Bedtime    Acetaminophen  MG Oral Tab CR Take 2 tablets (1,300 mg total) by mouth every 8 (eight) hours.   5/19/2025 Morning    ketoconazole 2 % External Cream    Taking    Glatiramer Acetate (COPAXONE) 40 MG/ML Subcutaneous Solution Prefilled Syringe Inject 40 mg into the skin 3 (three) times a week. Monday, Wednesday, Friday (Patient taking differently: Inject 40 mg into the skin 3 (three) times a week. Monday, Wednesday, Friday   MS) 12 each 6 5/16/2025    Loratadine 10 MG Oral Tablet Dispersible Take 1 tablet (10 mg total) by mouth in the  morning.   5/18/2025 Bedtime    finasteride 1 MG Oral Tab Take 1 tablet (1 mg total) by mouth in the morning.   5/18/2025 Bedtime   [3]   Past Surgical History:  Procedure Laterality Date    Fracture surgery      RIGHT HEEL SURGERY    Hernia surgery  09/20/2023    Laparoscopic inguinal hernia repair Bilateral 09/18/2023    Other surgical history  01/01/2001    nasal passage surgery    Pfo closure - external referral  12/19/2023    Urine flow measurement  12/01/2009    normal   [4]   Social History  Socioeconomic History    Marital status:    Occupational History    Occupation:    Tobacco Use    Smoking status: Never    Smokeless tobacco: Never   Vaping Use    Vaping status: Never Used   Substance and Sexual Activity    Alcohol use: Not Currently     Comment: none for the last 10 months    Drug use: No   Other Topics Concern     Service No    Blood Transfusions No    Caffeine Concern Yes     Comment: 3 cups of coffee daily; energy drinks 2 cans week    Occupational Exposure No    Hobby Hazards No    Sleep Concern No    Stress Concern No    Weight Concern No    Special Diet No    Back Care Yes    Exercise Yes     Comment: walks dog 20 minute day    Bike Helmet Yes    Seat Belt Yes    Self-Exams Yes

## 2025-05-19 NOTE — ANESTHESIA POSTPROCEDURE EVALUATION
[facility]    Juan Peters Patient Status:  Hospital Outpatient Surgery   Age/Gender 45 year old male MRN [id number]   Location [facility] SURGERY Attending Merrick Lewis MD   Hosp Day # 0 PCP Hero Linn MD       Anesthesia Post-op Note    RIGHT HYDROCELECTOMY    Procedure Summary       Date: 05/19/25 Room / Location:  MAIN OR 03 / [facility] MAIN OR    Anesthesia Start: 0706 Anesthesia Stop: 0845    Procedure: RIGHT HYDROCELECTOMY (Right: Perineum) Diagnosis:       Hydrocele, unspecified hydrocele type      (Hydrocele, unspecified hydrocele type [N43.3])    Surgeons: Merrick Lewis MD Anesthesiologist: Rubin Angel MD    Anesthesia Type: general ASA Status: 3            Anesthesia Type: general    Vitals Value Taken Time   /83 05/19/25 08:45   Temp 97.5F 05/19/25 08:45   Pulse 86 05/19/25 08:45   Resp 15 05/19/25 08:45   SpO2 100% 05/19/25 08:45           Patient Location: PACU    Anesthesia Type: general    Airway Patency: patent    Postop Pain Control: adequate    Mental Status: preanesthetic baseline    Nausea/Vomiting: none    Cardiopulmonary/Hydration status: stable euvolemic    Complications: no apparent anesthesia related complications    Postop vital signs: stable    Dental Exam: Unchanged from Preop    Patient to be discharged from PACU when criteria met.

## 2025-05-19 NOTE — OPERATIVE REPORT
Urology Operative Note    Attending Surgeon: Merrick Lewis MD    Assistant Surgeon: None    Patient Name: uJan Peters    Date of Surgery: 5/19/2025    Preoperative Diagnosis: Right hydrocele    Postoperative Diagnosis: Same    Procedure Performed: Right hydrocelectomy    Indication:  Patient is a 45 year old male who was found to have a symptomatic hydrocele. The patient was counseled on options, risks, and benefits and elected to undergo the above procedure. We discussed risks including, but not limited to, bleeding, infection, damage to surrounding structures, need for repeat procedure(s). The patient understood these risks and wished to proceed with surgery.    Findings:  Uncomplicated procedure. Excellent hemostasis at end of procedure.    Procedure:  The patient was taken to the operating room and a timeout was performed confirming the correct patient and procedure. The patient was prepped and draped in supine position after undergoing general anesthesia. Pre-operative prophylactic antibiotics were given in the form of Ancef.    A 5 cm incision was made in the right hemiscrotum overlying the hydrocele. I dissected down to the hydrocele sac. The testicle and hydrocele were delivered from the scrotum. I then incised the hydrocele sac and the fluid was drained. A portion of the hydrocele sac was then excised and sent for pathology. The hydrocele sac edges were then cauterized and sutured with running 3-0 Vicryl suture for hemostasis and prevention of recurrence.    Hemostasis was excellent at this point. The wound was irrigated with sterile saline. A 1/2 inch Penrose drain was placed through a small incision in the dependent-most portion of the hemiscrotum and secured to the skin with a 3-0 Nylon drain suture and a safety pin through the drain. The testicle was well perfused and placed back in its normal anatomic position within the scrotum. The dartos fascia was then closed using running 3-0 Vicryl  suture. The skin was closed using 3-0 Chromic interrupted horizontal mattress sutures. Local anesthesia was given to the spermatic cord with 0.5% Marcaine. The patient was cleaned and dried. Dressing was applied with Telfa and fluffed up Kerlix rolls with a Jock strap. Dermabond placed on incision.    The patient was awoken from anesthesia and transferred to PACU in stable condition. The patient tolerated the procedure well. All instrument/supply counts were correct at the end of the case.    Specimens:   Right hydrocele sac    Estimated Blood Loss:  5 mL    Tubes/Drains:  Scrotal Penrose drain    Complications:   None immediate    Condition from OR:  Stable    Plan:   Follow-up surgical pathology.  Penrose drain removal in 3-4 days. Return to clinic in 4 weeks for wound check.      Merrick Lewis MD  Staff Urologist  [facility]  Office: [phone]

## 2025-05-23 ENCOUNTER — OFFICE VISIT (OUTPATIENT)
Dept: SURGERY | Facility: CLINIC | Age: 45
End: 2025-05-23
Payer: COMMERCIAL

## 2025-05-23 DIAGNOSIS — Z98.890 HISTORY OF HYDROCELECTOMY: Primary | ICD-10-CM

## 2025-05-23 PROCEDURE — 99024 POSTOP FOLLOW-UP VISIT: CPT | Performed by: PHYSICIAN ASSISTANT

## 2025-05-23 NOTE — PROGRESS NOTES
Subjective:   Juan Peters is a 45 year old male who presents for Post-Op (S/p right hydrocelectomy 5/19/25. Here for drain removal.)     Doing well, no pain.    History/Other:    Chief Complaint Reviewed and Verified  Nursing Notes Reviewed and   Verified  Allergies Reviewed  Medications Reviewed         Current Medications[1]    Review of Systems:  Pertinent items are noted in HPI.      Objective:   There were no vitals taken for this visit. Estimated body mass index is 28.13 kg/m² as calculated from the following:    Height as of 5/19/25: 5' 8\" (1.727 m).    Weight as of 5/19/25: 185 lb (83.9 kg).    No distress  Non labored respirations  : drain suture removed and drain removed intact without difficulty, gauze placed  Incision c/d/I, mild edema as expected    Laboratory Data:  Lab Results   Component Value Date    WBC 3.9 (L) 05/18/2025    HGB 16.1 05/18/2025    .0 05/18/2025     Lab Results   Component Value Date     05/18/2025    K 4.2 05/18/2025     05/18/2025    CO2 27.0 05/18/2025    BUN 17 05/18/2025    GLU 82 05/18/2025    GFRAA 137 02/20/2014    AST 28 09/22/2020    ALT 21 09/22/2020    TP 7.1 09/22/2020    ALB 4.8 09/22/2020    CA 9.9 05/18/2025       Urinalysis Results (last three years):  Recent Labs     04/15/25  1411   SPECGRAVITY 1.010   PHURINE 6.5   NITRITE Negative       Urine Culture Results (last three years):  No results found for: \"URINECUL\"    Imaging  No results found.    Assessment & Plan:   S/p hydrocelectomy 5/19/25  Drain removed, expectations reviewed  Restrictions reviewed  Follow up as scheduled      Madhavi Martinez PA-C, 5/23/2025       [1]   Current Outpatient Medications   Medication Sig Dispense Refill    cefuroxime 500 MG Oral Tab Take 1 tablet (500 mg total) by mouth 2 (two) times daily.      traMADol 50 MG Oral Tab Take 0.5 tablets (25 mg total) by mouth every 8 (eight) hours as needed for Pain.      Polyethylene Glycol 3350 (MIRALAX)  17 g Oral Powd Pack Take 17 g by mouth daily as needed (Take to avoid constipation, especially if taking narcotic pain medications.). 20 packet 1    HYDROcodone-acetaminophen 5-325 MG Oral Tab Take 1 tablet by mouth every 4 (four) hours as needed for Pain. 20 tablet 0    buPROPion  MG Oral Tablet 24 Hr Take 1 tablet (300 mg total) by mouth daily.      Acetaminophen  MG Oral Tab CR Take 2 tablets (1,300 mg total) by mouth every 8 (eight) hours.      ketoconazole 2 % External Cream       Glatiramer Acetate (COPAXONE) 40 MG/ML Subcutaneous Solution Prefilled Syringe Inject 40 mg into the skin 3 (three) times a week. Monday, Wednesday, Friday (Patient taking differently: Inject 40 mg into the skin 3 (three) times a week. Monday, Wednesday, Friday   MS) 12 each 6    Loratadine 10 MG Oral Tablet Dispersible Take 1 tablet (10 mg total) by mouth in the morning.      finasteride 1 MG Oral Tab Take 1 tablet (1 mg total) by mouth in the morning.

## 2025-05-27 RX ORDER — GLATIRAMER 40 MG/ML
INJECTION, SOLUTION SUBCUTANEOUS
Qty: 12 ML | Refills: 11 | Status: SHIPPED | OUTPATIENT
Start: 2025-05-27

## 2025-05-27 NOTE — TELEPHONE ENCOUNTER
Medication: Glatiramer 40 mg     Date of last refill: 09/23/2024 # 12/6  [place]      Last office visit: 05/12/2025  Due back to clinic per last office note:    Date next office visit scheduled:    Future Appointments   Date Time Provider Department Center   6/17/2025  1:30 PM Merrick Lewis MD [id number]           Last OV note recommendation:    Discussion plus Diagnostics & Treatment Orders:  Patient is doing relatively well with Copaxone.  Although he presented with optic neuritis, he is aquaporin test was negative and CSF demonstrated the presence of 8 oligoclonal bands.  There is a lesion in the medulla right in the area of the area postrema which we need to monitor.             Procedures    z Insight MRI BRAIN (W+WO) (CPT=70553)               (x) Discussed potential side effects of any treatment relevant to above.  Includes explanation of tests as necessary.     Return in about 1 year (around 5/12/2026).

## 2025-06-17 ENCOUNTER — OFFICE VISIT (OUTPATIENT)
Dept: SURGERY | Facility: CLINIC | Age: 45
End: 2025-06-17
Payer: COMMERCIAL

## 2025-06-17 DIAGNOSIS — Z98.890 HISTORY OF HYDROCELECTOMY: Primary | ICD-10-CM

## 2025-06-17 PROCEDURE — 99024 POSTOP FOLLOW-UP VISIT: CPT | Performed by: SURGERY

## 2025-06-17 NOTE — PROGRESS NOTES
Urology Clinic Note    Primary Care Provider:  Hero Linn MD     Chief Complaint:   Right hydrocele     HPI & Assessment:   Juan Peters is a 45 year old male with history of MS, CVA referred for hydrocele.     He had a bilateral inguinal hernia repair 2 years ago.  Over the past few months he has noticed enlarging right scrotal swelling.  This causes him discomfort.     Scrotal ultrasound 4/10/2025 shows a large right hydrocele.  Physical exam consistent with moderate right hydrocele.     I brought him to the OR on 5/19/2025 for right hydrocelectomy.  Drain removed 5/23/2025.    Surgical pathology showed benign fibromuscular pseudocyst/sac with patchy chronic inflammation.    Pain and swelling are no longer present.  On exam he is a well-healed incision with very little scrotal edema at this time.    Plan:   - Return to clinic as needed       PSA:  Lab Results   Component Value Date    PSA 0.823 09/22/2020        History:   Past Medical History[1]    Past Surgical History[2]    Family History[3]    Short Social Hx on File[4]    Medications (Active prior to today's visit):  Current Medications[5]    Allergies:  Allergies[6]    Review of Systems:   A comprehensive 10-point review of systems was completed.  Pertinent positives and negatives are noted in the the HPI.    Physical Exam:   CONSTITUTIONAL: Well developed, well nourished, in no acute distress  NEUROLOGIC: Alert and oriented  HEAD: Normocephalic, atraumatic  EYES: Sclera non-icteric  ENT: Hearing intact, moist mucous membranes  NECK: No obvious goiter or masses  RESPIRATORY: Normal respiratory effort  SKIN: No evident rashes  ABDOMEN: Soft, non-tender, non-distended  GENITOURINARY: Well-healing scrotum after hydrocelectomy      In total, 30 minutes were spent on this patient encounter (including chart review, patient history, physical, counseling, documentation, and communication).    Merrick Lewis MD  Staff Urologist  [facility]  Office: [phone]           [1]   Past Medical History:   Arrhythmia    AFIB NO MEDS    Blood disorder    MTHFR    MS (multiple sclerosis) ([facility])    Optic neuritis    LEFT EYE    PFO (patent foramen ovale) ([facility])    PONV (postoperative nausea and vomiting)    Stroke ([facility])    2014, 2022. ALL SYMPTOMS RESOLVED. MED MANAGEMENT    Visual impairment    contacts/glasses   [2]   Past Surgical History:  Procedure Laterality Date    Fracture surgery      RIGHT HEEL SURGERY    Hernia surgery  09/20/2023    Laparoscopic inguinal hernia repair Bilateral 09/18/2023    Other surgical history  01/01/2001    nasal passage surgery    Pfo closure - external referral  12/19/2023    Urine flow measurement  12/01/2009    normal   [3]   Family History  Problem Relation Age of Onset    Diabetes Maternal Grandfather     Neurological Disorder Maternal Grandmother         dementia    Neurological Disorder Brother         tic   [4]   Social History  Socioeconomic History    Marital status:    Occupational History    Occupation:    Tobacco Use    Smoking status: Never    Smokeless tobacco: Never   Vaping Use    Vaping status: Never Used   Substance and Sexual Activity    Alcohol use: Not Currently     Comment: none for the last 10 months    Drug use: No   Other Topics Concern     Service No    Blood Transfusions No    Caffeine Concern Yes     Comment: 3 cups of coffee daily; energy drinks 2 cans week    Occupational Exposure No    Hobby Hazards No    Sleep Concern No    Stress Concern No    Weight Concern No    Special Diet No    Back Care Yes    Exercise Yes     Comment: walks dog 20 minute day    Bike Helmet Yes    Seat Belt Yes    Self-Exams Yes     Social Drivers of Health     Food Insecurity: Low Risk  (12/19/2023)    Received from [facility]    Food Insecurity     Within the past 12 months, you worried that your food would run out before you got money to buy more.  : Never true      Within the past 12 months, the food you bought just didn't last and you didn't have money to get more. : Never true   Transportation Needs: No Transportation Needs (6/14/2023)    Received from [facility]    PRAPARE - Transportation     Lack of Transportation (Medical): No     Lack of Transportation (Non-Medical): No   Housing Stability: Low Risk  (6/14/2023)    Received from [facility]    Housing Stability     What is your living situation today?: I have a steady place to live   [5]   Current Outpatient Medications   Medication Sig Dispense Refill    GLATIRAMER ACETATE 40 MG/ML Subcutaneous Solution Prefilled Syringe INJECT 40 MG UNDER THE SKIN 3 TIMES A WEEK ON MONDAY, WEDNESDAY, AND FRIDAY 12 mL 11    traMADol 50 MG Oral Tab Take 0.5 tablets (25 mg total) by mouth every 8 (eight) hours as needed for Pain. (Patient not taking: Reported on 6/17/2025)      Polyethylene Glycol 3350 (MIRALAX) 17 g Oral Powd Pack Take 17 g by mouth daily as needed (Take to avoid constipation, especially if taking narcotic pain medications.). 20 packet 1    buPROPion  MG Oral Tablet 24 Hr Take 1 tablet (300 mg total) by mouth daily.      Acetaminophen  MG Oral Tab CR Take 2 tablets (1,300 mg total) by mouth every 8 (eight) hours.      ketoconazole 2 % External Cream       Loratadine 10 MG Oral Tablet Dispersible Take 1 tablet (10 mg total) by mouth in the morning.      finasteride 1 MG Oral Tab Take 1 tablet (1 mg total) by mouth in the morning.     [6] No Known Allergies

## 2025-07-03 ENCOUNTER — TELEPHONE (OUTPATIENT)
Dept: NEUROLOGY | Facility: CLINIC | Age: 45
End: 2025-07-03

## 2025-07-03 DIAGNOSIS — G35 MULTIPLE SCLEROSIS (HCC): Primary | ICD-10-CM

## 2025-07-03 RX ORDER — GLATIRAMER 40 MG/ML
40 INJECTION, SOLUTION SUBCUTANEOUS
Qty: 12 ML | Refills: 11 | Status: SHIPPED | OUTPATIENT
Start: 2025-07-04

## 2025-07-03 NOTE — TELEPHONE ENCOUNTER
GLATIRAMER ACETATE 40 MG/ML     Advised up to 72 hours business days for refill    Send to:  [facility]

## 2025-07-03 NOTE — TELEPHONE ENCOUNTER
Patient had 1 year's worth script issued 5/27/25 to [facility].    Now looking to change to [facility] pharmacy. Script forwarded as requested.

## 2025-07-08 ENCOUNTER — TELEPHONE (OUTPATIENT)
Dept: NEUROLOGY | Facility: CLINIC | Age: 45
End: 2025-07-08

## 2025-07-08 DIAGNOSIS — G35 MULTIPLE SCLEROSIS (HCC): ICD-10-CM

## 2025-07-08 RX ORDER — GLATIRAMER 40 MG/ML
40 INJECTION, SOLUTION SUBCUTANEOUS
Qty: 12 ML | Refills: 11 | Status: SHIPPED | OUTPATIENT
Start: 2025-07-09

## 2025-07-08 NOTE — TELEPHONE ENCOUNTER
Received notice via fax from [facility] that benefits have been researched for GLATOPA therapy and this therapy is now service with [facility].    Spoke to JUVENCIO at [facility] ([phone]) who provided Fax #[phone], where new GLATOPA Rx along with face sheet and OV notes (5/12) were faxed with confirmation receipt.

## 2025-07-18 NOTE — TELEPHONE ENCOUNTER
Called number below, spoke with Cat, provided all information, all questions answered.  Determination to be shared via fax with turn around time estimated to be 3-5 business day.  Case # [id number]    Send clinicals to fax # [phone] with confirmation fax.

## 2025-07-18 NOTE — TELEPHONE ENCOUNTER
Received call from Eliceo with [facility] -- advising after researching patient's benefits, the preferred drug with patient's insurance is brand name copaxone. Requesting that we send a PA for Copaxone.     States that this may need to be done verbally, as when she tried to initiate medication request and was advised by covermymeds that the request could not be completed electronically.    Advised patient's [place] [facility] Benefits number is: [phone]    Please advise.

## (undated) DEVICE — SUT CHRM GUT 3-0 27IN SH ABSRB UD 26MM 1/2

## (undated) DEVICE — MINI LAP PACK-LF: Brand: MEDLINE INDUSTRIES, INC.

## (undated) DEVICE — BANDAGE,GAUZE,BULKEE II,4.5"X4.1YD,STRL: Brand: MEDLINE

## (undated) DEVICE — SUT CHRM GUT 4-0 27IN SH ABSRB UD 26MM 1/2

## (undated) DEVICE — Device

## (undated) DEVICE — UNDYED BRAIDED (POLYGLACTIN 910), SYNTHETIC ABSORBABLE SUTURE: Brand: COATED VICRYL

## (undated) DEVICE — INTRODUCER SHTH 6FR 50CM 12CM GW HEMOSTASIS VLV SDPRT DIL

## (undated) DEVICE — SKN PREP SPNG STKS PVP PNT STR: Brand: MEDLINE INDUSTRIES, INC.

## (undated) DEVICE — SOL NACL IRRIG 0.9% 1000ML BTL

## (undated) DEVICE — SUPPORTER ATHLETIC LG

## (undated) DEVICE — TROCAR: Brand: KII SHIELDED BLADED ACCESS SYSTEM

## (undated) DEVICE — ENDOCUT SCISSOR TIP, DISPOSABLE: Brand: RENEW

## (undated) DEVICE — STRL PENROSE DRAIN 18" X 1/2": Brand: CARDINAL HEALTH

## (undated) DEVICE — INTRODUCER SHTH 4FR .035IN 50CM 12CM 2 DIST GW HEMOSTASIS

## (undated) DEVICE — PLUMEPORT ACTIV LAPAROSCOPIC SMOKE FILTRATION DEVICE: Brand: PLUMEPORT ACTIVE

## (undated) DEVICE — STANDARD HYPODERMIC NEEDLE,POLYPROPYLENE HUB: Brand: MONOJECT

## (undated) DEVICE — INSUFFLATION NEEDLE TO ESTABLISH PNEUMOPERITONEUM.: Brand: INSUFFLATION NEEDLE

## (undated) DEVICE — 2, DISPOSABLE SUCTION/IRRIGATOR WITHOUT DISPOSABLE TIP: Brand: STRYKEFLOW

## (undated) DEVICE — MONOFILAMENT ABSORBABLE SUTURE: Brand: MAXON

## (undated) DEVICE — ANTIBACTERIAL UNDYED BRAIDED (POLYGLACTIN 910), SYNTHETIC ABSORBABLE SUTURE: Brand: COATED VICRYL

## (undated) DEVICE — SLEEVE COMPR MD KNEE LEN SGL USE KENDALL SCD

## (undated) DEVICE — SUT VICRYL 2-0 SH J417H

## (undated) DEVICE — CATHETER 6FR MPA2 CRV 100CM SH 2 WIRE BRAID STIFF PROX SHAFT

## (undated) DEVICE — CATHETER PA 110CM 6FR 9MM ARW 1 LUM BLN WDG PRSS TPR TIP 1CC

## (undated) DEVICE — SOLUTION IRRIG 1000ML 0.9% NACL USP BTL

## (undated) DEVICE — TRADITIONAL MARYLAND DISSECTOR TIP, DISPOSABLE: Brand: RENEW

## (undated) DEVICE — TRAY SURESTEP 16 BARDEX UMETR

## (undated) DEVICE — APPLICATOR PREP 26ML CHG 2% ISO ALC 70%

## (undated) DEVICE — VIOLET BRAIDED (POLYGLACTIN 910), SYNTHETIC ABSORBABLE SUTURE: Brand: COATED VICRYL

## (undated) DEVICE — SUT ETHLN 3-0 18IN FS-1 NABSRB BLK 24MM 3/8 C

## (undated) DEVICE — SCROTAL SUPPORT ATHLETIC LARGE

## (undated) DEVICE — GLOVE SUR 7 SENSICARE PI PIP CRM PWD F

## (undated) DEVICE — SHEATH 8FR 80MM .035IN DELIVERY DLV SYS ANG AMPLZR TREVISIO

## (undated) DEVICE — LIGHT HANDLE

## (undated) DEVICE — GENERAL LAPAROS CDS-LF: Brand: MEDLINE INDUSTRIES, INC.

## (undated) DEVICE — PAD,NON-ADHERENT,3X8,STERILE,LF,1/PK: Brand: MEDLINE

## (undated) DEVICE — ZZ-CONVERTED-TO-524825-ADHESIVE SKIN TOP FOR WND CLSR DERMBND ADV

## (undated) DEVICE — HUNTER GASPER TIP, DISPOSABLE: Brand: RENEW

## (undated) DEVICE — TROCAR: Brand: KII® SLEEVE

## (undated) DEVICE — GAUZE,SPONGE,4"X4",12PLY,STERILE,LF,2'S: Brand: MEDLINE

## (undated) DEVICE — PAD SACRAL PREMIUM 12X12X1

## (undated) DEVICE — GUIDEWIRE AMPLZR 1.5MM 260CM MOD J CRV .035IN VASC SUP STIFF

## (undated) DEVICE — STERILE POLYISOPRENE POWDER-FREE SURGICAL GLOVES: Brand: PROTEXIS

## (undated) DEVICE — CAPSURE PERMANENT FIXATION SYSTEM 30 PERMANENT FASTENERS: Brand: CAPSURE PERMANENT FIXATION SYSTEM

## (undated) DEVICE — OINTMENT SKIN 3 ANTIBIO BACI ZN NEOMYCN SULF

## (undated) DEVICE — SOLUTION ENDOSCOPIC ANTI-FOG NON-TOXIC NON-ABRASIVE 6 CUBIC CENTIMETER WITH RADIOPAQUE ADHESIVE-BACKED SPONGE STERILE NOT MADE WITH NATURAL RUBBER LATEX MEDICHOICE: Brand: MEDICHOICE

## (undated) DEVICE — SLEEVE KENDALL SCD EXPRESS MED

## (undated) NOTE — LETTER
23    Patient: Liam Mathew  : 1980 Visit date: 2023    Dear  Sheila Victoria MD    Thank you for referring Liam Mathew to my practice. Please find my assessment and plan below. Assessment   Right inguinal hernia  (primary encounter diagnosis)  Patent foramen ovale      Plan   The patient was offered laparoscopic right inguinal hernia repair with mesh. The risks, benefits, and alternatives of this were discussed in detail with the patient. Risks included, but were not limited to, recurrence of the hernia, bleeding, wound infection, and injury to the hernia contents. The patient was agreeable to proceed with the operation. He will need cardiac clearance prior to surgery. His surgery has been scheduled for 2023.       Sincerely,       Pilar Sanchez MD   CC: Tj Marks MD

## (undated) NOTE — LETTER
To: Dr. Logan Love  Date:  8/31/2023  Patient Name: Diego Staples / Sex: 1/9/1980-A: 37 y  male  Medical Records: [id number]   CSN: [id number]      Clearance for Surgery Requested by Surgeon    Request for:  Cardiac Clearance    Requested by Surgeon: Suha Adams MD    Surgical Date: 9/18/2023    Procedure: Laparoscopic Inguinal Hernia Repair With Mesh, Right      Please fax the clearance note to the [facility]  department. Thank you.

## (undated) NOTE — LETTER
Davin Santiago M.D., F.A.C.S. Surgical Clearance Needed    Date: 8/28/2023                                                                       From: Areli Heath    Attn: Dr. Owen Elliott                                                                                    RE: Surgical Clearance               # of Pages: 1        Patient Name: Andreea Lara    Patient YOB: 1980    Consult For: Cardiac Clearance    Surgery: LAPAROSCOPIC RIGHT [address]    Date of Surgery: 9/18/23 at [facility]         This facsimile transmission is provided for your internal use only. If the reader of this message is not the intended recipient, you are hereby notified that you have received this document in error, and that any review, dissemination, distribution, or copying of this message is strictly prohibited. If you have received this communication in error, please notify us immediately by telephone and return the original message to us by mail.